# Patient Record
Sex: FEMALE | Race: BLACK OR AFRICAN AMERICAN | NOT HISPANIC OR LATINO | Employment: FULL TIME | URBAN - METROPOLITAN AREA
[De-identification: names, ages, dates, MRNs, and addresses within clinical notes are randomized per-mention and may not be internally consistent; named-entity substitution may affect disease eponyms.]

---

## 2018-02-16 ENCOUNTER — OFFICE VISIT CONVERTED (OUTPATIENT)
Dept: FAMILY MEDICINE CLINIC | Facility: CLINIC | Age: 28
End: 2018-02-16
Attending: NURSE PRACTITIONER

## 2018-04-17 ENCOUNTER — CONVERSION ENCOUNTER (OUTPATIENT)
Dept: GASTROENTEROLOGY | Facility: CLINIC | Age: 28
End: 2018-04-17

## 2018-04-17 ENCOUNTER — OFFICE VISIT CONVERTED (OUTPATIENT)
Dept: GASTROENTEROLOGY | Facility: CLINIC | Age: 28
End: 2018-04-17
Attending: NURSE PRACTITIONER

## 2018-08-01 ENCOUNTER — OFFICE VISIT CONVERTED (OUTPATIENT)
Dept: FAMILY MEDICINE CLINIC | Facility: CLINIC | Age: 28
End: 2018-08-01
Attending: NURSE PRACTITIONER

## 2018-09-05 ENCOUNTER — OFFICE VISIT CONVERTED (OUTPATIENT)
Dept: GASTROENTEROLOGY | Facility: CLINIC | Age: 28
End: 2018-09-05
Attending: NURSE PRACTITIONER

## 2019-05-20 ENCOUNTER — HOSPITAL ENCOUNTER (OUTPATIENT)
Dept: URGENT CARE | Facility: CLINIC | Age: 29
Discharge: HOME | End: 2019-05-20

## 2019-05-20 LAB — MONONUCLEOSIS TEST, QUAL: NEGATIVE

## 2019-05-22 LAB — BACTERIA SPEC AEROBE CULT: ABNORMAL

## 2020-03-05 ENCOUNTER — HOSPITAL ENCOUNTER (OUTPATIENT)
Dept: OTHER | Facility: HOSPITAL | Age: 30
Discharge: HOME | End: 2020-03-05
Attending: NURSE PRACTITIONER

## 2020-03-11 LAB — DEPRECATED HIV1 SER IB-IMP: NEGATIVE

## 2020-07-14 ENCOUNTER — HOSPITAL ENCOUNTER (OUTPATIENT)
Dept: URGENT CARE | Facility: CLINIC | Age: 30
Discharge: HOME | End: 2020-07-14
Attending: FAMILY MEDICINE

## 2021-02-20 ENCOUNTER — HOSPITAL ENCOUNTER (OUTPATIENT)
Dept: URGENT CARE | Facility: CLINIC | Age: 31
Discharge: HOME | End: 2021-02-20
Attending: EMERGENCY MEDICINE

## 2021-02-23 LAB
BACTERIA UR CULT: NORMAL
C TRACH RRNA CVX QL NAA+PROBE: NEGATIVE
N GONORRHOEA DNA SPEC QL NAA+PROBE: NEGATIVE
SARS-COV-2 RNA SPEC QL NAA+PROBE: NOT DETECTED

## 2021-05-16 VITALS
HEIGHT: 69 IN | DIASTOLIC BLOOD PRESSURE: 62 MMHG | SYSTOLIC BLOOD PRESSURE: 114 MMHG | WEIGHT: 174 LBS | BODY MASS INDEX: 25.77 KG/M2

## 2021-05-16 VITALS
SYSTOLIC BLOOD PRESSURE: 115 MMHG | BODY MASS INDEX: 27.11 KG/M2 | RESPIRATION RATE: 18 BRPM | OXYGEN SATURATION: 100 % | DIASTOLIC BLOOD PRESSURE: 59 MMHG | HEIGHT: 69 IN | WEIGHT: 183 LBS | HEART RATE: 73 BPM

## 2021-05-16 VITALS
RESPIRATION RATE: 16 BRPM | HEIGHT: 69 IN | SYSTOLIC BLOOD PRESSURE: 131 MMHG | DIASTOLIC BLOOD PRESSURE: 78 MMHG | OXYGEN SATURATION: 99 % | HEART RATE: 83 BPM

## 2021-05-16 VITALS
DIASTOLIC BLOOD PRESSURE: 64 MMHG | BODY MASS INDEX: 29.35 KG/M2 | WEIGHT: 198.12 LBS | SYSTOLIC BLOOD PRESSURE: 120 MMHG | HEIGHT: 69 IN

## 2023-04-25 ENCOUNTER — APPOINTMENT (OUTPATIENT)
Dept: CT IMAGING | Facility: HOSPITAL | Age: 33
End: 2023-04-25
Payer: COMMERCIAL

## 2023-04-25 ENCOUNTER — HOSPITAL ENCOUNTER (EMERGENCY)
Facility: HOSPITAL | Age: 33
Discharge: HOME | End: 2023-04-25
Attending: EMERGENCY MEDICINE | Admitting: EMERGENCY MEDICINE
Payer: COMMERCIAL

## 2023-04-25 VITALS
TEMPERATURE: 98.4 F | OXYGEN SATURATION: 100 % | HEIGHT: 69 IN | WEIGHT: 143 LBS | RESPIRATION RATE: 16 BRPM | DIASTOLIC BLOOD PRESSURE: 83 MMHG | HEART RATE: 91 BPM | SYSTOLIC BLOOD PRESSURE: 132 MMHG | BODY MASS INDEX: 21.18 KG/M2

## 2023-04-25 DIAGNOSIS — J20.9 ACUTE BRONCHITIS, UNSPECIFIED ORGANISM: Primary | ICD-10-CM

## 2023-04-25 LAB
ALBUMIN SERPL-MCNC: 3.9 G/DL (ref 3.5–5.2)
ALBUMIN/GLOB SERPL: 1 G/DL
ALP SERPL-CCNC: 76 U/L (ref 39–117)
ALT SERPL W P-5'-P-CCNC: 15 U/L (ref 1–33)
ANION GAP SERPL CALCULATED.3IONS-SCNC: 8 MMOL/L (ref 5–15)
AST SERPL-CCNC: 19 U/L (ref 1–32)
BASOPHILS # BLD AUTO: 0.02 10*3/MM3 (ref 0–0.2)
BASOPHILS NFR BLD AUTO: 0.4 % (ref 0–1.5)
BILIRUB SERPL-MCNC: <0.2 MG/DL (ref 0–1.2)
BUN SERPL-MCNC: 9 MG/DL (ref 6–20)
BUN/CREAT SERPL: 11.5 (ref 7–25)
CALCIUM SPEC-SCNC: 8.9 MG/DL (ref 8.6–10.5)
CHLORIDE SERPL-SCNC: 101 MMOL/L (ref 98–107)
CO2 SERPL-SCNC: 27 MMOL/L (ref 22–29)
CREAT SERPL-MCNC: 0.78 MG/DL (ref 0.57–1)
DEPRECATED RDW RBC AUTO: 45.8 FL (ref 37–54)
EGFRCR SERPLBLD CKD-EPI 2021: 104.3 ML/MIN/1.73
EOSINOPHIL # BLD AUTO: 0.41 10*3/MM3 (ref 0–0.4)
EOSINOPHIL NFR BLD AUTO: 7.5 % (ref 0.3–6.2)
ERYTHROCYTE [DISTWIDTH] IN BLOOD BY AUTOMATED COUNT: 13.5 % (ref 12.3–15.4)
GLOBULIN UR ELPH-MCNC: 3.9 GM/DL
GLUCOSE SERPL-MCNC: 107 MG/DL (ref 65–99)
HCT VFR BLD AUTO: 37.3 % (ref 34–46.6)
HGB BLD-MCNC: 12.2 G/DL (ref 12–15.9)
IMM GRANULOCYTES # BLD AUTO: 0.01 10*3/MM3 (ref 0–0.05)
IMM GRANULOCYTES NFR BLD AUTO: 0.2 % (ref 0–0.5)
INR PPP: 0.97 (ref 0.86–1.15)
LYMPHOCYTES # BLD AUTO: 1.78 10*3/MM3 (ref 0.7–3.1)
LYMPHOCYTES NFR BLD AUTO: 32.7 % (ref 19.6–45.3)
MCH RBC QN AUTO: 30 PG (ref 26.6–33)
MCHC RBC AUTO-ENTMCNC: 32.7 G/DL (ref 31.5–35.7)
MCV RBC AUTO: 91.6 FL (ref 79–97)
MONOCYTES # BLD AUTO: 0.66 10*3/MM3 (ref 0.1–0.9)
MONOCYTES NFR BLD AUTO: 12.1 % (ref 5–12)
NEUTROPHILS NFR BLD AUTO: 2.57 10*3/MM3 (ref 1.7–7)
NEUTROPHILS NFR BLD AUTO: 47.1 % (ref 42.7–76)
NRBC BLD AUTO-RTO: 0 /100 WBC (ref 0–0.2)
PLATELET # BLD AUTO: 306 10*3/MM3 (ref 140–450)
PMV BLD AUTO: 10.3 FL (ref 6–12)
POTASSIUM SERPL-SCNC: 3.7 MMOL/L (ref 3.5–5.2)
PROT SERPL-MCNC: 7.8 G/DL (ref 6–8.5)
PROTHROMBIN TIME: 13 SECONDS (ref 11.8–14.9)
RBC # BLD AUTO: 4.07 10*6/MM3 (ref 3.77–5.28)
S PYO AG THROAT QL: NEGATIVE
SODIUM SERPL-SCNC: 136 MMOL/L (ref 136–145)
WBC NRBC COR # BLD: 5.45 10*3/MM3 (ref 3.4–10.8)

## 2023-04-25 PROCEDURE — 80053 COMPREHEN METABOLIC PANEL: CPT | Performed by: EMERGENCY MEDICINE

## 2023-04-25 PROCEDURE — 85025 COMPLETE CBC W/AUTO DIFF WBC: CPT | Performed by: EMERGENCY MEDICINE

## 2023-04-25 PROCEDURE — 87880 STREP A ASSAY W/OPTIC: CPT | Performed by: EMERGENCY MEDICINE

## 2023-04-25 PROCEDURE — 71260 CT THORAX DX C+: CPT

## 2023-04-25 PROCEDURE — 85610 PROTHROMBIN TIME: CPT | Performed by: EMERGENCY MEDICINE

## 2023-04-25 PROCEDURE — 99283 EMERGENCY DEPT VISIT LOW MDM: CPT

## 2023-04-25 PROCEDURE — 87147 CULTURE TYPE IMMUNOLOGIC: CPT | Performed by: EMERGENCY MEDICINE

## 2023-04-25 PROCEDURE — 87081 CULTURE SCREEN ONLY: CPT | Performed by: EMERGENCY MEDICINE

## 2023-04-25 PROCEDURE — 25510000001 IOPAMIDOL PER 1 ML: Performed by: EMERGENCY MEDICINE

## 2023-04-25 RX ORDER — BENZONATATE 200 MG/1
200 CAPSULE ORAL 3 TIMES DAILY PRN
Qty: 20 CAPSULE | Refills: 0 | Status: SHIPPED | OUTPATIENT
Start: 2023-04-25

## 2023-04-25 RX ORDER — PREDNISONE 50 MG/1
50 TABLET ORAL DAILY
Qty: 5 TABLET | Refills: 0 | Status: SHIPPED | OUTPATIENT
Start: 2023-04-25

## 2023-04-25 RX ORDER — AZITHROMYCIN 250 MG/1
TABLET, FILM COATED ORAL
Qty: 6 TABLET | Refills: 0 | Status: SHIPPED | OUTPATIENT
Start: 2023-04-25 | End: 2023-04-30

## 2023-04-25 RX ADMIN — IOPAMIDOL 100 ML: 755 INJECTION, SOLUTION INTRAVENOUS at 09:45

## 2023-04-25 NOTE — DISCHARGE INSTRUCTIONS
Be sure to follow-up with the pulmonologist especially if your symptoms continue as a bronchoscopy may be the next step to evaluate the blood in your sputum.

## 2023-04-25 NOTE — ED PROVIDER NOTES
Time: 9:02 AM EDT  Date of encounter:  4/25/2023  Independent Historian/Clinical History and Information was obtained by: Patient, Family and Chart  Chief Complaint: cough  History is limited by: N/A  Room number: 21/21     History of Present Illness:  HPI  Patient is a 32 y.o. year old female who presents to the Emergency Department via private car for evaluation of cough with diaphoresis, fever, chills, hemoptysis, sore throat that started approximately 2:00am this morning (04/25/2023). Patient denies all other symptoms.    Patient Care Team  Primary Care Provider: Adrian Cartwright APRN    Past Medical History:  Allergies   Allergen Reactions   • Peanut Oil Anaphylaxis     Past Medical History:   Diagnosis Date   • Anemia    • Asthma    • Depression    • IBS (irritable bowel syndrome)    • Migraines    • Scoliosis      Past Surgical History:   Procedure Laterality Date   • COLONOSCOPY     • ENDOSCOPY     • ROOT CANAL       History reviewed. No pertinent family history.    Home Medications:  Prior to Admission medications    Medication Sig Start Date End Date Taking? Authorizing Provider   amitriptyline (ELAVIL) 25 MG tablet amitriptyline 25 mg oral tablet take 1 tablet (25 mg) by oral route once daily at bedtime   Active    Emergency, Nurse MARISOL Denney   cetirizine (zyrTEC) 10 MG tablet cetirizine 10 mg oral tablet take 1 tablet (10 mg) by oral route once daily   Active    Emergency, Nurse Epic, RN   cyclobenzaprine (FLEXERIL) 10 MG tablet cyclobenzaprine 10 mg oral tablet take 1 tablet (10 mg) by oral route 3 times per day   Active  Patient not taking: Reported on 7/23/2022    Emergency, Nurse Олег RN   diclofenac (VOLTAREN) 50 MG EC tablet diclofenac sodium 50 mg oral tablet,delayed release (DR/EC) take 1 tablet (50 mg) by oral route 3 times per day   Active    Emergency, Nurse Epic, RN   EPINEPHrine (EpiPen 2-Ector) 0.3 MG/0.3ML solution auto-injector injection 0.3 mL.    Emergency, Nurse Олег RN  "  ibuprofen (ADVIL,MOTRIN) 800 MG tablet ibuprofen 800 mg oral tablet take 1 tablet (800 mg) by oral route 3 times per day with food   Active    Emergency, Nurse Олег RN   topiramate (TOPAMAX) 50 MG tablet topiramate 50 mg oral tablet take 1 tablet (50 mg) by oral route 2 times per day   Active    Emergency, Nurse Epic, RN   triamcinolone (KENALOG) 0.1 % cream triamcinolone acetonide 0.1 % topical cream apply a thin layer to the affected area(s) by topical route 2 times per day   Active    Emergency, Nurse Epic, RN   venlafaxine (EFFEXOR) 50 MG tablet Take 50 mg by mouth 2 (Two) Times a Day.    Emergency, Nurse Олег RN        Social History:  Social History     Tobacco Use   • Smoking status: Former   • Smokeless tobacco: Never   Vaping Use   • Vaping Use: Every day   • Substances: Nicotine, Flavoring   Substance Use Topics   • Alcohol use: Yes     Comment: rare   • Drug use: Never       Review of Systems:  Review of Systems   Constitutional: Positive for chills, diaphoresis and fever.   HENT: Positive for sore throat.    Eyes: Negative.    Respiratory: Positive for cough (with blood).    Cardiovascular: Negative.    Gastrointestinal: Negative.  Vomiting: with blood.   Endocrine: Negative.    Genitourinary: Negative.    Musculoskeletal: Negative.    Skin: Negative.    Allergic/Immunologic: Negative.    Neurological: Negative.    Hematological: Negative.    Psychiatric/Behavioral: Negative.    All other systems reviewed and are negative.         Physical Exam:  /83   Pulse 95   Temp 98.4 °F (36.9 °C) (Oral)   Resp 16   Ht 175.3 cm (69\")   Wt 64.9 kg (143 lb)   SpO2 100%   BMI 21.12 kg/m²     Physical Exam  Vitals and nursing note reviewed.   Constitutional:       General: She is not in acute distress.     Appearance: Normal appearance. She is not toxic-appearing.   HENT:      Head: Normocephalic and atraumatic.      Jaw: There is normal jaw occlusion.   Eyes:      General: Lids are normal.      " Extraocular Movements: Extraocular movements intact.      Conjunctiva/sclera: Conjunctivae normal.      Pupils: Pupils are equal, round, and reactive to light.   Cardiovascular:      Rate and Rhythm: Normal rate and regular rhythm.      Pulses: Normal pulses.      Heart sounds: Normal heart sounds.   Pulmonary:      Effort: Pulmonary effort is normal. No respiratory distress.      Breath sounds: Normal breath sounds. No wheezing or rhonchi.   Abdominal:      General: Abdomen is flat.      Palpations: Abdomen is soft.      Tenderness: There is no abdominal tenderness. There is no guarding or rebound.   Musculoskeletal:         General: Normal range of motion.      Cervical back: Normal range of motion and neck supple.      Right lower leg: No edema.      Left lower leg: No edema.   Skin:     General: Skin is warm and dry.   Neurological:      Mental Status: She is alert and oriented to person, place, and time. Mental status is at baseline.   Psychiatric:         Mood and Affect: Mood normal.                Procedures:  Procedures    Medical Decision Making:    Comorbidities that affect care:    asthma    External Notes reviewed:    Previous Clinic Note: Patient was seen for cough and congestion in urgent care center.    The following orders were placed and all results were independently analyzed by me:  Orders Placed This Encounter   Procedures   • Rapid Strep A Screen - Swab, Throat   • Beta Strep Culture, Throat - Swab, Throat   • CT Chest With Contrast Diagnostic   • Comprehensive Metabolic Panel   • Protime-INR   • CBC Auto Differential   • CBC & Differential       Medications Given in the Emergency Department:  Medications   iopamidol (ISOVUE-370) 76 % injection 100 mL (100 mL Intravenous Given 4/25/23 0945)       ED Course:       Labs:  Lab Results (last 24 hours)     Procedure Component Value Units Date/Time    CBC & Differential [[id number]]  (Abnormal) Collected: 04/25/23 0917    Specimen: Blood Updated:  04/25/23 0932    Narrative:      The following orders were created for panel order CBC & Differential.  Procedure                               Abnormality         Status                     ---------                               -----------         ------                     CBC Auto Differential[[id number]]        Abnormal            Final result                 Please view results for these tests on the individual orders.    Comprehensive Metabolic Panel [[id number]]  (Abnormal) Collected: 04/25/23 0917    Specimen: Blood Updated: 04/25/23 0954     Glucose 107 mg/dL      BUN 9 mg/dL      Creatinine 0.78 mg/dL      Sodium 136 mmol/L      Potassium 3.7 mmol/L      Chloride 101 mmol/L      CO2 27.0 mmol/L      Calcium 8.9 mg/dL      Total Protein 7.8 g/dL      Albumin 3.9 g/dL      ALT (SGPT) 15 U/L      AST (SGOT) 19 U/L      Alkaline Phosphatase 76 U/L      Total Bilirubin <0.2 mg/dL      Globulin 3.9 gm/dL      A/G Ratio 1.0 g/dL      BUN/Creatinine Ratio 11.5     Anion Gap 8.0 mmol/L      eGFR 104.3 mL/min/1.73     Narrative:      GFR Normal >60  Chronic Kidney Disease <60  Kidney Failure <15      Protime-INR [[id number]]  (Normal) Collected: 04/25/23 0917    Specimen: Blood Updated: 04/25/23 0941     Protime 13.0 Seconds      INR 0.97    Narrative:      Suggested Therapeutic Ranges For Oral Anticoagulant Therapy:  Level of Therapy                      INR Target Range  Standard Dose                            2.0-3.0  High Dose                                2.5-3.5  Patients not receiving anticoagulant  Therapy Normal Range                     0.86-1.15    Rapid Strep A Screen - Swab, Throat [[id number]]  (Normal) Collected: 04/25/23 0917    Specimen: Swab from Throat Updated: 04/25/23 0954     Strep A Ag Negative    CBC Auto Differential [[id number]]  (Abnormal) Collected: 04/25/23 0917    Specimen: Blood Updated: 04/25/23 0932     WBC 5.45 10*3/mm3      RBC 4.07 10*6/mm3      Hemoglobin 12.2 g/dL      Hematocrit  37.3 %      MCV 91.6 fL      MCH 30.0 pg      MCHC 32.7 g/dL      RDW 13.5 %      RDW-SD 45.8 fl      MPV 10.3 fL      Platelets 306 10*3/mm3      Neutrophil % 47.1 %      Lymphocyte % 32.7 %      Monocyte % 12.1 %      Eosinophil % 7.5 %      Basophil % 0.4 %      Immature Grans % 0.2 %      Neutrophils, Absolute 2.57 10*3/mm3      Lymphocytes, Absolute 1.78 10*3/mm3      Monocytes, Absolute 0.66 10*3/mm3      Eosinophils, Absolute 0.41 10*3/mm3      Basophils, Absolute 0.02 10*3/mm3      Immature Grans, Absolute 0.01 10*3/mm3      nRBC 0.0 /100 WBC     Beta Strep Culture, Throat - Swab, Throat [[id number]] Collected: 04/25/23 0917    Specimen: Swab from Throat Updated: 04/25/23 0954           Imaging:  CT Chest With Contrast Diagnostic    Result Date: 4/25/2023  PROCEDURE: CT CHEST W CONTRAST DIAGNOSTIC  COMPARISON:  None INDICATIONS: shortness of breath, coughing up blood, cough since friday  TECHNIQUE: After obtaining the patient's consent, CT images were obtained with non-ionic intravenous contrast material.   PROTOCOL:   Pulmonary embolism imaging protocol performed    RADIATION:   DLP: 439.4mGy*cm   Automated exposure control was utilized to minimize radiation dose. CONTRAST: 100cc Isovue 370 I.V.  FINDINGS:  There is probable minor dependent atelectasis in the lung bases.  No focal pulmonary consolidations are evident.  No pleural fluid is seen.  Central airways are grossly patent.  The pulmonary arteries appear normal caliber.  No pulmonary arterial filling defects are evident to suggest PE.  The thoracic aorta appears normal caliber.  No mediastinal adenopathy is identified.  Amorphous density in the anterior mediastinal fat may be due to residual thymic tissue.  Limited imaging through the upper abdomen demonstrates a grossly normal adrenal morphology.  No acute abnormality is identified.  No axillary or supraclavicular adenopathy is identified.  Visualized thyroid is unremarkable.  Visualized osseous  structures are unremarkable.         1. No acute thoracic abnormality identified.  No evidence of pulmonary embolus.  2. Probable minor dependent atelectasis in the lung bases.  3. Incidental findings as given above.      PAULINE FRAZIER MD       Electronically Signed and Approved By: PAULINE FRAZIER MD on 4/25/2023 at 10:19               Differential Diagnosis and Discussion:    Cough: Differential diagnosis includes but is not limited to pneumonia, acute bronchitis, upper respiratory infection, ACE inhibitor use, allergic reaction, epiglottitis, seasonal allergies, chemical irritants, exercise-induced asthma, viral syndrome.    All labs were reviewed and interpreted by me.  CT scan radiology impression was interpreted by me.     The patient´s CBC that was reviewed and interpreted by me shows no abnormalities of critical concern. Of note, there is no anemia requiring a blood transfusion and the platelet count is acceptable.  The patient´s CMP that was reviewed and interpretted by me shows no abnormalities of critical concern. Of note, the patient´s sodium and potassium are acceptable. The patient´s liver enzymes are unremarkable. The patient´s renal function (creatinine) is preserved. The patient has a normal anion gap.  CT scan of the chest is negative for PE or intrathoracic mass.    MDM     The differential diagnosis for hemoptysis includes a wide range of conditions such as bronchitis, pneumonia, tuberculosis, lung cancer, pulmonary embolism, and bronchiectasis.    A normal CT scan of the chest can help rule out certain entities in the differential diagnosis of hemoptysis, including:    Pulmonary embolism: A pulmonary embolism is a blood clot that travels to the lungs and can cause hemoptysis. A normal CT scan can effectively rule out pulmonary embolism as a cause of hemoptysis.    Lung cancer: A CT scan of the chest can detect lung nodules and masses, which can be indicative of lung cancer. A normal CT scan can help  rule out lung cancer as a cause of hemoptysis.    Bronchiectasis: Bronchiectasis is a condition in which the airways in the lungs become damaged and widened, leading to recurrent infections and hemoptysis. A CT scan can detect the characteristic changes in the airways, and a normal CT scan can help rule out bronchiectasis as a cause of hemoptysis.    The patient presented with a productive cough. The patient is well-appearing and in no respiratory distress. The patient has a normal mental status and is neurologically intact. The patient appears well and is able to tolerate food for fluid by mouth and there's no significant dehydration. There is no respiratory distress and no signs of systemic toxicity. The history, exam, diagnostic testing and current condition do not demonstrate an infectious process such as meningitis, severe pneumonia, retropharyngeal abscess, epiglottitis, sepsis or other serious bacterial infection requiring further testing, treatment, consultation, or admission at this time. The patient has no additional oxygen requirement nor are there any signs of respiratory distress. The patient has a chest x-ray interpreted by me that is negative for pneumonia. Asthma, URI, GERD are also considered. The vital signs have been stable and the patient has had no hypoxia. The patient's condition is stable and appropriate for discharge. The patient will pursue further outpatient evaluation with the primary care physician, or designated physician. The patient will expressed a clear and thorough understanding and agreed to follow-up as instructed.        Patient Care Considerations:    CONSULT: I considered consulting Pulmonology, however The patient has no respiratory distress, nor is she hypoxic.    Consultants/Shared Management Plan:    None    Social Determinants of Health:    Patient is independent, reliable, and has access to care.     Disposition and Care Coordination:    Discharged: I considered  escalation of care by admitting this patient for observation, however the patient has improved and is suitable and  stable for discharge.    I have explained the patient´s condition, diagnoses and treatment plan based on the information available to me at this time. I have answered questions and addressed any concerns. The patient has a good  understanding of the patient´s diagnosis, condition, and treatment plan as can be expected at this point. The vital signs have been stable. The patient´s condition is stable and appropriate for discharge from the emergency department.      The patient will pursue further outpatient evaluation with the primary care physician or other designated or consulting physician as outlined in the discharge instructions. They are agreeable to this plan of care and follow-up instructions have been explained in detail. The patient has received these instructions in written format and have expressed an understanding of the discharge instructions. The patient is aware that any significant change in condition or worsening of symptoms should prompt an immediate return to this or the closest emergency department or call to 911.  I have explained discharge medications and the need for follow up with the patient/caretakers. This was also printed in the discharge instructions. Patient was discharged with the following medications and follow up:      Medication List      New Prescriptions    azithromycin 250 MG tablet  Commonly known as: ZITHROMAX  Take 2 tablets by mouth Daily for 1 day, THEN 1 tablet Daily for 4 days.  Start taking on: April 25, 2023     benzonatate 200 MG capsule  Commonly known as: TESSALON  Take 1 capsule by mouth 3 (Three) Times a Day As Needed for Cough.     predniSONE 50 MG tablet  Commonly known as: DELTASONE  Take 1 tablet by mouth Daily.           Where to Get Your Medications      [id number]  [place] - [address] AT [facility] - [phone]  - [phone] FX  [address] [place] [address]    Phone: [phone]   · azithromycin 250 MG tablet  · benzonatate 200 MG capsule  · predniSONE 50 MG tablet      Adrian Cartwright, APRN  [address] [place] [address] [phone]    In 2 days         Final diagnoses:   Acute bronchitis, unspecified organism        ED Disposition     ED Disposition   Discharge    Condition   Stable    Comment   --             This medical record created using voice recognition software.    Documentation assistance provided by Cathryn Lloyd acting a scribe for Sneha Parker MD. Information recorded by the scribe was verified and validated at my direction.     Cathryn Lloyd  04/25/23 0909       Sneha Parker MD  04/25/23 8959

## 2023-04-27 LAB — BACTERIA SPEC AEROBE CULT: NORMAL

## 2023-12-01 PROCEDURE — 87635 SARS-COV-2 COVID-19 AMP PRB: CPT | Performed by: EMERGENCY MEDICINE

## 2024-01-29 PROCEDURE — 87081 CULTURE SCREEN ONLY: CPT | Performed by: FAMILY MEDICINE

## 2024-03-20 ENCOUNTER — TELEMEDICINE (OUTPATIENT)
Dept: FAMILY MEDICINE CLINIC | Facility: TELEHEALTH | Age: 34
End: 2024-03-20
Payer: COMMERCIAL

## 2024-03-20 DIAGNOSIS — Z20.822 EXPOSURE TO COVID-19 VIRUS: Primary | ICD-10-CM

## 2024-03-20 NOTE — PROGRESS NOTES
You have chosen to receive care through a telehealth visit.  Do you consent to use a video/audio connection for your medical care today? Yes     CHIEF COMPLAINT  No chief complaint on file.        HPI  Maggi Nelson is a 33 y.o. female  presents with complaint of traveling to [facility], needs COVID test to prove no infection.  She is not currently having any symptoms.  Her mother tested positive on 3/9/24 and she and daughter were around her,but have not developed symptoms.      Review of Systems  See HPI    Past Medical History:   Diagnosis Date    Anemia     Asthma     Depression     IBS (irritable bowel syndrome)     Migraines     Scoliosis        No family history on file.    Social History     Socioeconomic History    Marital status: Single   Tobacco Use    Smoking status: Former    Smokeless tobacco: Never   Vaping Use    Vaping status: Every Day    Substances: Nicotine, Flavoring   Substance and Sexual Activity    Alcohol use: Yes     Comment: rare    Drug use: Never    Sexual activity: Defer       Maggi Nelson  reports that she has quit smoking. She has never used smokeless tobacco.               There were no vitals taken for this visit.    PHYSICAL EXAM  Physical Exam   Constitutional: She is oriented to person, place, and time. She appears well-developed and well-nourished. She does not have a sickly appearance. She does not appear ill.   HENT:   Head: Normocephalic and atraumatic.   Pulmonary/Chest: Effort normal.  No respiratory distress.  Neurological: She is alert and oriented to person, place, and time.       Results for orders placed or performed during the hospital encounter of 03/20/24   DENNISE FLU + SARS PCR    Specimen: Nasal Cavity; Swab   Result Value Ref Range    COVID19 Not Detected Not Detected - Ref. Range    POC Influenza A, Molecular Negative Negative / Not Detected    POC Influenza B, Molecular Negative Negative / Not Detected    Internal Control Passed  Passed    Lot Number [id number]     Expiration Date 70,243,742        Diagnoses and all orders for this visit:    1. Exposure to COVID-19 virus (Primary)  -     DENNISE FLU + SARS PCR; Future    Negative, pt notified via phone.       FOLLOW-UP  As discussed during visit with PCP/[facility] if no improvement or Urgent Care/Emergency Department if worsening of symptoms    Patient verbalizes understanding of medication dosage, comfort measures, instructions for treatment and follow-up.    Carol Hicks, APRN  03/20/2024  14:59 EDT    The use of a video visit has been reviewed with the patient and verbal informed consent has been obtained. Myself and Maggi Nelson participated in this visit. The patient is located in [address] [place], [place] [address] [place] [address].    I am located in [place], [place]. Mychart and Twilio were utilized. I spent 8 minutes in the patient's chart for this visit.      Note Disclaimer: At [facility], we believe that sharing information builds trust and better   relationships. You are receiving this note because you recently visited [facility]. It is possible you   will see health information before a provider has talked with you about it. This kind of information can   be easy to misunderstand. To help you fully understand what it means for your health, we urge you to   discuss this note with your provider.

## 2024-11-22 ENCOUNTER — OFFICE VISIT (OUTPATIENT)
Dept: FAMILY MEDICINE CLINIC | Facility: CLINIC | Age: 34
End: 2024-11-22
Payer: COMMERCIAL

## 2024-11-22 ENCOUNTER — TELEPHONE (OUTPATIENT)
Dept: FAMILY MEDICINE CLINIC | Facility: CLINIC | Age: 34
End: 2024-11-22

## 2024-11-22 ENCOUNTER — PRIOR AUTHORIZATION (OUTPATIENT)
Dept: FAMILY MEDICINE CLINIC | Facility: CLINIC | Age: 34
End: 2024-11-22

## 2024-11-22 VITALS
OXYGEN SATURATION: 100 % | TEMPERATURE: 98.1 F | WEIGHT: 232.5 LBS | SYSTOLIC BLOOD PRESSURE: 122 MMHG | BODY MASS INDEX: 34.44 KG/M2 | HEART RATE: 81 BPM | DIASTOLIC BLOOD PRESSURE: 76 MMHG | HEIGHT: 69 IN

## 2024-11-22 DIAGNOSIS — L20.82 FLEXURAL ECZEMA: ICD-10-CM

## 2024-11-22 DIAGNOSIS — E55.9 VITAMIN D DEFICIENCY: ICD-10-CM

## 2024-11-22 DIAGNOSIS — Z00.00 ENCOUNTER FOR MEDICAL EXAMINATION TO ESTABLISH CARE: Primary | ICD-10-CM

## 2024-11-22 DIAGNOSIS — G43.901 MIGRAINE WITH STATUS MIGRAINOSUS, NOT INTRACTABLE, UNSPECIFIED MIGRAINE TYPE: ICD-10-CM

## 2024-11-22 DIAGNOSIS — R53.83 FATIGUE, UNSPECIFIED TYPE: ICD-10-CM

## 2024-11-22 DIAGNOSIS — Z97.5 IUD (INTRAUTERINE DEVICE) IN PLACE: ICD-10-CM

## 2024-11-22 DIAGNOSIS — Z91.010 PEANUT ALLERGY: ICD-10-CM

## 2024-11-22 DIAGNOSIS — Z00.00 ANNUAL PHYSICAL EXAM: ICD-10-CM

## 2024-11-22 DIAGNOSIS — F32.A ANXIETY AND DEPRESSION: ICD-10-CM

## 2024-11-22 DIAGNOSIS — K58.1 IRRITABLE BOWEL SYNDROME WITH CONSTIPATION: ICD-10-CM

## 2024-11-22 DIAGNOSIS — G47.26 SHIFT WORK SLEEP DISORDER: ICD-10-CM

## 2024-11-22 DIAGNOSIS — F41.9 ANXIETY AND DEPRESSION: ICD-10-CM

## 2024-11-22 LAB
25(OH)D3 SERPL-MCNC: 19.7 NG/ML (ref 30–100)
ALBUMIN SERPL-MCNC: 4.1 G/DL (ref 3.5–5.2)
ALBUMIN/GLOB SERPL: 1.2 G/DL
ALP SERPL-CCNC: 72 U/L (ref 39–117)
ALT SERPL W P-5'-P-CCNC: 12 U/L (ref 1–33)
ANION GAP SERPL CALCULATED.3IONS-SCNC: 10 MMOL/L (ref 5–15)
AST SERPL-CCNC: 19 U/L (ref 1–32)
BASOPHILS # BLD MANUAL: 0.06 10*3/MM3 (ref 0–0.2)
BASOPHILS NFR BLD MANUAL: 1 % (ref 0–1.5)
BILIRUB SERPL-MCNC: 0.2 MG/DL (ref 0–1.2)
BUN SERPL-MCNC: 17 MG/DL (ref 6–20)
BUN/CREAT SERPL: 18.9 (ref 7–25)
CALCIUM SPEC-SCNC: 9.2 MG/DL (ref 8.6–10.5)
CHLORIDE SERPL-SCNC: 100 MMOL/L (ref 98–107)
CHOLEST SERPL-MCNC: 152 MG/DL (ref 0–200)
CO2 SERPL-SCNC: 25 MMOL/L (ref 22–29)
CREAT SERPL-MCNC: 0.9 MG/DL (ref 0.57–1)
DEPRECATED RDW RBC AUTO: 40.6 FL (ref 37–54)
EGFRCR SERPLBLD CKD-EPI 2021: 87.3 ML/MIN/1.73
EOSINOPHIL # BLD MANUAL: 0.36 10*3/MM3 (ref 0–0.4)
EOSINOPHIL NFR BLD MANUAL: 6.2 % (ref 0.3–6.2)
ERYTHROCYTE [DISTWIDTH] IN BLOOD BY AUTOMATED COUNT: 12.4 % (ref 12.3–15.4)
FOLATE SERPL-MCNC: 9.82 NG/ML (ref 4.78–24.2)
GLOBULIN UR ELPH-MCNC: 3.4 GM/DL
GLUCOSE SERPL-MCNC: 93 MG/DL (ref 65–99)
HCT VFR BLD AUTO: 32.7 % (ref 34–46.6)
HDLC SERPL-MCNC: 49 MG/DL (ref 40–60)
HGB BLD-MCNC: 10.8 G/DL (ref 12–15.9)
LDLC SERPL CALC-MCNC: 94 MG/DL (ref 0–100)
LDLC/HDLC SERPL: 1.95 {RATIO}
LYMPHOCYTES # BLD MANUAL: 3.09 10*3/MM3 (ref 0.7–3.1)
LYMPHOCYTES NFR BLD MANUAL: 6.2 % (ref 5–12)
MCH RBC QN AUTO: 29.8 PG (ref 26.6–33)
MCHC RBC AUTO-ENTMCNC: 33 G/DL (ref 31.5–35.7)
MCV RBC AUTO: 90.1 FL (ref 79–97)
MONOCYTES # BLD: 0.36 10*3/MM3 (ref 0.1–0.9)
NEUTROPHILS # BLD AUTO: 1.9 10*3/MM3 (ref 1.7–7)
NEUTROPHILS NFR BLD MANUAL: 33 % (ref 42.7–76)
PLAT MORPH BLD: NORMAL
PLATELET # BLD AUTO: 290 10*3/MM3 (ref 140–450)
PMV BLD AUTO: 11.2 FL (ref 6–12)
POTASSIUM SERPL-SCNC: 3.7 MMOL/L (ref 3.5–5.2)
PROT SERPL-MCNC: 7.5 G/DL (ref 6–8.5)
RBC # BLD AUTO: 3.63 10*6/MM3 (ref 3.77–5.28)
RBC MORPH BLD: NORMAL
SODIUM SERPL-SCNC: 135 MMOL/L (ref 136–145)
T4 FREE SERPL-MCNC: 1.05 NG/DL (ref 0.92–1.68)
TRIGL SERPL-MCNC: 37 MG/DL (ref 0–150)
TSH SERPL DL<=0.05 MIU/L-ACNC: 10.7 UIU/ML (ref 0.27–4.2)
VARIANT LYMPHS NFR BLD MANUAL: 53.6 % (ref 19.6–45.3)
VIT B12 BLD-MCNC: 769 PG/ML (ref 211–946)
VLDLC SERPL-MCNC: 9 MG/DL (ref 5–40)
WBC MORPH BLD: NORMAL
WBC NRBC COR # BLD AUTO: 5.77 10*3/MM3 (ref 3.4–10.8)

## 2024-11-22 PROCEDURE — 84439 ASSAY OF FREE THYROXINE: CPT

## 2024-11-22 PROCEDURE — 82746 ASSAY OF FOLIC ACID SERUM: CPT

## 2024-11-22 PROCEDURE — 80061 LIPID PANEL: CPT

## 2024-11-22 PROCEDURE — 82306 VITAMIN D 25 HYDROXY: CPT

## 2024-11-22 PROCEDURE — 85027 COMPLETE CBC AUTOMATED: CPT

## 2024-11-22 PROCEDURE — 82607 VITAMIN B-12: CPT

## 2024-11-22 PROCEDURE — 84443 ASSAY THYROID STIM HORMONE: CPT

## 2024-11-22 PROCEDURE — 80053 COMPREHEN METABOLIC PANEL: CPT

## 2024-11-22 PROCEDURE — 85007 BL SMEAR W/DIFF WBC COUNT: CPT

## 2024-11-22 RX ORDER — CLOBETASOL PROPIONATE 0.5 MG/G
1 OINTMENT TOPICAL 2 TIMES DAILY
Qty: 60 G | Refills: 1 | Status: SHIPPED | OUTPATIENT
Start: 2024-11-22

## 2024-11-22 RX ORDER — VENLAFAXINE HYDROCHLORIDE 37.5 MG/1
CAPSULE, EXTENDED RELEASE ORAL
Qty: 60 CAPSULE | Refills: 2 | Status: SHIPPED | OUTPATIENT
Start: 2024-11-22

## 2024-11-22 RX ORDER — EPINEPHRINE 0.3 MG/.3ML
0.3 INJECTION SUBCUTANEOUS ONCE
Qty: 2 EACH | Refills: 0 | Status: SHIPPED | OUTPATIENT
Start: 2024-11-22 | End: 2024-11-22

## 2024-11-22 NOTE — PROGRESS NOTES
Chief Complaint  Chief Complaint   Patient presents with    [facility]    Annual Exam    Fatigue    Anxiety       Subjective      Maggi Nelson presents to [facility]  History of Present Illness  The patient is a 33-year-old female who presents today as a new patient to [facility] and have a physical exam.    She reports feeling very tired and fatigued with an increase in anxiety recently. She has been dealing with anxiety and depression for a long time but is not currently on any medication due to side effects. She has not had therapy for a year due to insurance issues. 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    She is requesting a referral to gynecology as her current Mirena IUD has . She is not currently sexually active and has not had a period since the IUD was placed. Prior to the IUD, she had heavy and painful periods.    She has several severe allergies, including allergies to nuts and peanut oil, and is in need of a new EpiPen.    Her medical history includes anemia, anxiety, asthma, depression, irritable bowel syndrome (IBS), migraines, and scoliosis. She also suffers from eczema, which is currently affecting her armpits, neck, and legs. The eczema in her armpits has worsened, resembling hidradenitis suppurativa (HS). She had previously used chlorhexidine soap and cortisone cream for relief, but the latter causes a burning sensation. She has not used deodorant for a month due to the burning sensation it causes.    She has a family history of high blood pressure, high cholesterol, diabetes, and thyroid problems.    She was previously on Topamax for migraines and Ambien for sleep. She has a history of constipation and is currently experiencing it, having not had a bowel movement in 4 days. She had a colonoscopy 7 years ago and was on Linzess, which was helpful. She also  experiences abdominal pain and cramping.    ALLERGIES  She is allergic to NUTS and PEANUT OIL.      Objective     Medical History:  Past Medical History:   Diagnosis Date    Anemia     Anxiety     Asthma     Depression     IBS (irritable bowel syndrome)     Migraines     Scoliosis      Past Surgical History:   Procedure Laterality Date    COLONOSCOPY      ENDOSCOPY      MULTIPLE TOOTH EXTRACTIONS      ROOT CANAL        Social History     Tobacco Use    Smoking status: Former     Current packs/day: 0.00     Average packs/day: 1 pack/day for 15.0 years (15.0 ttl pk-yrs)     Types: Cigarettes     Start date: 2008     Quit date: 2023     Years since quittin.8    Smokeless tobacco: Never   Vaping Use    Vaping status: Every Day    Substances: Nicotine, Flavoring    Devices: Disposable   Substance Use Topics    Alcohol use: Yes     Comment: rare    Drug use: Never     History reviewed. No pertinent family history.    Medications:  Prior to Admission medications    Medication Sig Start Date End Date Taking? Authorizing Provider   benzonatate (TESSALON) 200 MG capsule Take 1 capsule by mouth 3 (Three) Times a Day As Needed for Cough. 24   Ron Mckay MD   brompheniramine-pseudoephedrine-DM 30-2-10 MG/5ML syrup Take 10 mL by mouth 3 (Three) Times a Day As Needed for Congestion or Cough. 24   Ron Mckay MD   ibuprofen (ADVIL,MOTRIN) 800 MG tablet Take 1 tablet by mouth Every 8 (Eight) Hours As Needed for Moderate Pain, Fever or Headache. Take with food 24   Oscar Nichols, DO   ondansetron ODT (ZOFRAN-ODT) 4 MG disintegrating tablet Place 2 tablets on the tongue Every 8 (Eight) Hours As Needed for Nausea or Vomiting. 24   Ron Mckay MD   phenol (CHLORASEPTIC) 1.4 % liquid liquid Apply 1 spray to the mouth or throat Every 2 (Two) Hours As Needed (Sore throat). 24   Oscar Nichols DO        Allergies:   Nuts, Peanut oil, and Adhesive tape    Health Maintenance Due   Topic  "Date Due    BMI FOLLOWUP  Never done    Pneumococcal Vaccine 0-64 (1 of 2 - PCV) Never done    TDAP/TD VACCINES (1 - Tdap) Never done    HEPATITIS C SCREENING  Never done    ANNUAL PHYSICAL  Never done    PAP SMEAR  Never done    COVID-19 Vaccine (1 - 2024-25 season) Never done         Vital Signs:   /76 (BP Location: Left arm, Patient Position: Sitting, Cuff Size: Adult)   Pulse 81   Temp 98.1 °F (36.7 °C) (Oral)   Ht 175.3 cm (69\")   Wt 105 kg (232 lb 8 oz)   SpO2 100%   BMI 34.33 kg/m²     Wt Readings from Last 3 Encounters:   11/22/24 105 kg (232 lb 8 oz)   01/31/24 105 kg (232 lb)   01/29/24 104 kg (228 lb 9.6 oz)     BP Readings from Last 3 Encounters:   11/22/24 122/76   01/31/24 134/79   01/29/24 159/69       BMI is >= 30 and <35. (Class 1 Obesity). The following options were offered after discussion;: exercise counseling/recommendations and nutrition counseling/recommendations       Physical Exam  Vitals reviewed.   Constitutional:       Appearance: Normal appearance. She is well-developed. She is obese.   HENT:      Head: Normocephalic and atraumatic.   Eyes:      Conjunctiva/sclera: Conjunctivae normal.      Pupils: Pupils are equal, round, and reactive to light.   Cardiovascular:      Rate and Rhythm: Normal rate and regular rhythm.      Heart sounds: No murmur heard.     No friction rub. No gallop.   Pulmonary:      Effort: Pulmonary effort is normal.      Breath sounds: Normal breath sounds. No wheezing or rhonchi.   Abdominal:      General: Bowel sounds are normal. There is no distension.      Palpations: Abdomen is soft.      Tenderness: There is no abdominal tenderness.   Skin:     General: Skin is warm and dry.   Neurological:      Mental Status: She is alert and oriented to person, place, and time.      Cranial Nerves: No cranial nerve deficit.   Psychiatric:         Mood and Affect: Mood and affect normal.         Behavior: Behavior normal.         Thought Content: Thought content " normal.         Judgment: Judgment normal.       Physical Exam        Result Review :    The following data was reviewed by DARREN Stein on 11/22/24 at 08:47 EST:        No Images in the past 120 days found..    Results                 Assessment and Plan    Diagnoses and all orders for this visit:    1. Encounter for medical examination to establish care (Primary)    2. Annual physical exam  -     CBC With Manual Differential  -     Comprehensive Metabolic Panel  -     Lipid Panel  -     TSH Rfx On Abnormal To Free T4    3. Fatigue, unspecified type  -     Vitamin D 25 hydroxy  -     Vitamin B12  -     Folate    4. Shift work sleep disorder    5. Peanut allergy  -     EPINEPHrine (EpiPen 2-Ector) 0.3 MG/0.3ML solution auto-injector injection; Inject 0.3 mL into the appropriate muscle as directed by prescriber 1 (One) Time for 1 dose.  Dispense: 2 each; Refill: 0    6. Flexural eczema  -     clobetasol (TEMOVATE) 0.05 % ointment; Apply 1 Application topically to the appropriate area as directed 2 (Two) Times a Day.  Dispense: 60 g; Refill: 01    7. Anxiety and depression  -     venlafaxine XR (Effexor XR) 37.5 MG 24 hr capsule; Take 1 tablet by mouth daily for 7 days, then take 2 tablets by mouth daily.  Dispense: 60 capsule; Refill: 2    8. Migraine with status migrainosus, not intractable, unspecified migraine type    9. Irritable bowel syndrome with constipation    10. IUD (intrauterine device) in place  -     Ambulatory Referral to Obstetrics / Gynecology    11. Vitamin D deficiency    Other orders  -     linaclotide (Linzess) 72 MCG capsule capsule; Take 1 capsule by mouth Every Morning Before Breakfast.  Dispense: 30 capsule; Refill: 2       Assessment & Plan  1. Eczema.  Clobetasol ointment was prescribed to be applied once or twice daily during flare-ups. She was advised that prolonged use of the steroid could cause darkening patches on the skin. She was also advised to use a sensitive deodorant  without alcohol or other chemicals once the condition improves.    2. Mirena IUD expiration.  A referral to gynecology was made for the removal of her  Mirena IUD and to discuss future contraceptive options.    3. Irritable Bowel Syndrome (IBS) with constipation.  Linzess was prescribed to manage her constipation.    4. Anxiety and Depression.  Effexor was prescribed to help with anxiety, depression, and migraine prevention. She was instructed to take one tablet daily for a week, then increase to two tablets daily, preferably in the morning to avoid affecting her sleep.    5. Severe allergies to nuts and peanut oil.  A prescription for an EpiPen was provided due to her severe allergies.    6. Fatigue.  Blood work was ordered to assess her blood sugar, kidney and liver function, thyroid level, vitamin D, and B12 levels to determine any underlying causes of her fatigue.    7. Hidradenitis Suppurativa (HS).  She was advised to return for evaluation and potential antibiotic treatment if her HS flares up.            Smoking Cessation:    Maggi Nelson  reports that she quit smoking about 22 months ago. Her smoking use included cigarettes. She started smoking about 16 years ago. She has a 15 pack-year smoking history. She has never used smokeless tobacco.             Follow Up   Return in about 3 months (around 2025) for Next scheduled follow up.  Patient was given instructions and counseling regarding her condition or for health maintenance advice. Please see specific information pulled into the AVS if appropriate.     Please note that portions of this note were completed with a voice recognition program.    Patient or patient representative verbalized consent for the use of Ambient Listening during the visit with  DARREN Stein for chart documentation. 2024  08:46 EST

## 2024-11-22 NOTE — TELEPHONE ENCOUNTER
Linzess 72MCG capsules PA APPROVAL     PA Case ID #: [id number]  Rx #: [id number]  Need Help? Call us at [phone]  Outcome  Approved today by Christine [facility] 2017  Your PA request has been approved. Additional information will be provided in the approval communication. (Message 1145)  Authorization Expiration Date: 11/22/2027

## 2024-11-26 DIAGNOSIS — Z91.010 PEANUT ALLERGY: Primary | ICD-10-CM

## 2024-11-27 DIAGNOSIS — E55.9 VITAMIN D DEFICIENCY: Primary | ICD-10-CM

## 2024-11-27 DIAGNOSIS — E61.1 IRON DEFICIENCY: ICD-10-CM

## 2024-11-27 RX ORDER — FERROUS GLUCONATE 324(38)MG
324 TABLET ORAL
Qty: 30 TABLET | Refills: 5 | Status: SHIPPED | OUTPATIENT
Start: 2024-11-27

## 2024-12-07 PROCEDURE — 87081 CULTURE SCREEN ONLY: CPT

## 2025-01-17 ENCOUNTER — OFFICE VISIT (OUTPATIENT)
Dept: FAMILY MEDICINE CLINIC | Facility: CLINIC | Age: 35
End: 2025-01-17
Payer: COMMERCIAL

## 2025-01-17 VITALS
OXYGEN SATURATION: 99 % | HEIGHT: 69 IN | HEART RATE: 91 BPM | DIASTOLIC BLOOD PRESSURE: 76 MMHG | TEMPERATURE: 98.1 F | BODY MASS INDEX: 34.14 KG/M2 | SYSTOLIC BLOOD PRESSURE: 134 MMHG | WEIGHT: 230.5 LBS

## 2025-01-17 DIAGNOSIS — F41.9 ANXIETY AND DEPRESSION: ICD-10-CM

## 2025-01-17 DIAGNOSIS — F32.A ANXIETY AND DEPRESSION: ICD-10-CM

## 2025-01-17 DIAGNOSIS — E55.9 VITAMIN D DEFICIENCY: ICD-10-CM

## 2025-01-17 DIAGNOSIS — G43.901 MIGRAINE WITH STATUS MIGRAINOSUS, NOT INTRACTABLE, UNSPECIFIED MIGRAINE TYPE: ICD-10-CM

## 2025-01-17 DIAGNOSIS — R11.2 NAUSEA AND VOMITING, UNSPECIFIED VOMITING TYPE: ICD-10-CM

## 2025-01-17 DIAGNOSIS — L02.416 ABSCESS OF LEFT LOWER LEG: Primary | ICD-10-CM

## 2025-01-17 RX ORDER — SULFAMETHOXAZOLE AND TRIMETHOPRIM 800; 160 MG/1; MG/1
1 TABLET ORAL 2 TIMES DAILY
Qty: 20 TABLET | Refills: 0 | Status: SHIPPED | OUTPATIENT
Start: 2025-01-17 | End: 2025-01-17 | Stop reason: SDUPTHER

## 2025-01-17 RX ORDER — VENLAFAXINE HYDROCHLORIDE 37.5 MG/1
CAPSULE, EXTENDED RELEASE ORAL
Qty: 60 CAPSULE | Refills: 2 | Status: SHIPPED | OUTPATIENT
Start: 2025-01-17

## 2025-01-17 RX ORDER — FLUCONAZOLE 150 MG/1
150 TABLET ORAL ONCE
Qty: 1 TABLET | Refills: 0 | Status: SHIPPED | OUTPATIENT
Start: 2025-01-17 | End: 2025-01-17

## 2025-01-17 RX ORDER — ONDANSETRON 8 MG/1
8 TABLET, ORALLY DISINTEGRATING ORAL EVERY 8 HOURS PRN
Qty: 15 TABLET | Refills: 0 | Status: SHIPPED | OUTPATIENT
Start: 2025-01-17

## 2025-01-17 RX ORDER — SULFAMETHOXAZOLE AND TRIMETHOPRIM 800; 160 MG/1; MG/1
1 TABLET ORAL 2 TIMES DAILY
Qty: 20 TABLET | Refills: 0 | Status: SHIPPED | OUTPATIENT
Start: 2025-01-17 | End: 2025-01-27

## 2025-01-17 RX ORDER — SUMATRIPTAN 50 MG/1
TABLET, FILM COATED ORAL
Qty: 6 TABLET | Refills: 0 | Status: SHIPPED | OUTPATIENT
Start: 2025-01-17

## 2025-01-17 NOTE — PROGRESS NOTES
"Chief Complaint  Insect Bite    Subjective      Maggi Nelson is a 34 y.o. female who presents to [facility]     History of Present Illness  The patient is a 34-year-old female who presents today with concern for a spider bite.    She reports taking a 1.5-hour nap yesterday, after which she experienced pain in her left leg upon waking up in the afternoon. The site of the pain now has a martin. She does not report any significant swelling in her lower leg but notes extreme sensitivity on the entire left side, even with movement. She has a history of MRSA infection.    Additionally, she has been experiencing nausea, dizziness, sensitivity to light, foggy thinking, and general malaise and fatigue.        Patient Care Team:  Saba Goldsmith APRN as PCP - General (Emergency Medicine)    Objective   Vital Signs:   Vitals:    01/17/25 1223   BP: 134/76   Pulse: 91   Temp: 98.1 °F (36.7 °C)   SpO2: 99%   Weight: 105 kg (230 lb 8 oz)   Height: 175.3 cm (69\")     Body mass index is 34.04 kg/m².    Wt Readings from Last 3 Encounters:   01/17/25 105 kg (230 lb 8 oz)   12/07/24 105 kg (231 lb 7.7 oz)   11/22/24 105 kg (232 lb 8 oz)     BP Readings from Last 3 Encounters:   01/17/25 134/76   12/07/24 137/73   11/22/24 122/76       Health Maintenance   Topic Date Due    Pneumococcal Vaccine 0-64 (1 of 2 - PCV) Never done    TDAP/TD VACCINES (1 - Tdap) Never done    HEPATITIS C SCREENING  Never done    PAP SMEAR  Never done    COVID-19 Vaccine (1 - 2024-25 season) Never done    INFLUENZA VACCINE  03/31/2025 (Originally 7/1/2024)    ANNUAL PHYSICAL  11/22/2025    BMI FOLLOWUP  11/22/2025       Lab Results (last 24 hours)       ** No results found for the last 24 hours. **               Physical Exam  Vitals and nursing note reviewed.   Constitutional:       General: She is not in acute distress.     Appearance: Normal appearance. She is ill-appearing.   HENT:      Head: " Normocephalic and atraumatic.   Eyes:      Extraocular Movements: Extraocular movements intact.      Conjunctiva/sclera: Conjunctivae normal.   Cardiovascular:      Rate and Rhythm: Normal rate and regular rhythm.      Heart sounds: Normal heart sounds.   Pulmonary:      Effort: Pulmonary effort is normal.      Breath sounds: Normal breath sounds.   Skin:     General: Skin is warm.      Findings: Lesion (Left lower leg: skin lesion with white head center and mild induration/swelling with severe ttp.) present.   Neurological:      General: No focal deficit present.      Mental Status: She is alert and oriented to person, place, and time.   Psychiatric:         Mood and Affect: Mood normal.         Behavior: Behavior normal.          Physical Exam  There is significant tenderness to palpation along the left lower leg where she reports a martin that popped up and is tender even for her clothes to touch.      Result Review   The following data was reviewed by: Sujatha Vargas MD on 01/17/2025:  [x]  Tests & Results  []  Hospitalization/Emergency Department/Urgent Care  []  Internal/External Consultant Notes    Results        Procedures          ASSESSMENT/PLAN  Diagnoses and all orders for this visit:    1. Abscess of left lower leg (Primary)  -     fluconazole (Diflucan) 150 MG tablet; Take 1 tablet by mouth 1 (One) Time for 1 dose.  Dispense: 1 tablet; Refill: 0  -     sulfamethoxazole-trimethoprim (Bactrim DS) 800-160 MG per tablet; Take 1 tablet by mouth 2 (Two) Times a Day for 10 days.  Dispense: 20 tablet; Refill: 0    2. Vitamin D deficiency  -     cholecalciferol (VITAMIN D3) 250 MCG (85528 UT) capsule; Take 1 capsule by mouth Daily.  Dispense: 30 capsule; Refill: 11    3. Migraine with status migrainosus, not intractable, unspecified migraine type  -     SUMAtriptan (Imitrex) 50 MG tablet; Take one tablet at onset of headache. May repeat dose one time in 2 hours if headache not relieved.  Dispense: 6  tablet; Refill: 0    4. Nausea and vomiting, unspecified vomiting type  -     ondansetron ODT (ZOFRAN-ODT) 8 MG disintegrating tablet; Take 1 tablet by mouth Every 8 (Eight) Hours As Needed for Nausea.  Dispense: 15 tablet; Refill: 0    5. Anxiety and depression  -     venlafaxine XR (Effexor XR) 37.5 MG 24 hr capsule; Take 1 tablet by mouth daily for 7 days, then take 2 tablets by mouth daily.  Dispense: 60 capsule; Refill: 2    Other orders  -     Discontinue: sulfamethoxazole-trimethoprim (Bactrim DS) 800-160 MG per tablet; Take 1 tablet by mouth 2 (Two) Times a Day for 10 days.  Dispense: 20 tablet; Refill: 0        Assessment & Plan  1. Suspected spider bite.  She reports significant tenderness to palpation along the left lower leg where a martin has developed. There is no significant swelling, but she experiences extreme sensitivity in the entire left side even with movement. She also reports symptoms of nausea, dizziness, sensitivity to light, foggy thinking, and general malaise and fatigue. Her past history includes a previous MRSA infection.    2. Migraine.  She reports significant migraine symptoms including headache, nausea, dizziness, sensitivity to light, foggy thinking, and general malaise and fatigue.                Maggi Nelson  reports that she quit smoking about 2 years ago. Her smoking use included cigarettes. She started smoking about 17 years ago. She has a 15 pack-year smoking history. She has never used smokeless tobacco.            FOLLOW UP  Return if symptoms worsen or fail to improve.  Patient was given instructions and counseling regarding her condition or for health maintenance advice. Please see specific information pulled into the AVS if appropriate.       Sujatha Vargas MD  01/17/25  15:40 EST    Part of this note may be an electronic transcription/translation of spoken language to printed text using the Dragon Dictation System.    Patient or patient  representative verbalized consent for the use of Ambient Listening during the visit with  Sujatha Vargas MD for chart documentation. 1/17/2025  15:40 EST

## 2025-01-28 ENCOUNTER — OFFICE VISIT (OUTPATIENT)
Dept: FAMILY MEDICINE CLINIC | Facility: CLINIC | Age: 35
End: 2025-01-28
Payer: COMMERCIAL

## 2025-01-28 ENCOUNTER — TELEPHONE (OUTPATIENT)
Dept: FAMILY MEDICINE CLINIC | Facility: CLINIC | Age: 35
End: 2025-01-28

## 2025-01-28 VITALS
OXYGEN SATURATION: 98 % | SYSTOLIC BLOOD PRESSURE: 108 MMHG | HEART RATE: 87 BPM | DIASTOLIC BLOOD PRESSURE: 58 MMHG | TEMPERATURE: 98.8 F | WEIGHT: 223.1 LBS | BODY MASS INDEX: 33.04 KG/M2 | HEIGHT: 69 IN

## 2025-01-28 DIAGNOSIS — R79.89 ELEVATED TSH: ICD-10-CM

## 2025-01-28 DIAGNOSIS — H53.149 PHOTOPHOBIA: ICD-10-CM

## 2025-01-28 DIAGNOSIS — L60.3 BRITTLE NAILS: ICD-10-CM

## 2025-01-28 DIAGNOSIS — E55.9 VITAMIN D DEFICIENCY: Primary | ICD-10-CM

## 2025-01-28 DIAGNOSIS — L85.3 DRY SKIN: ICD-10-CM

## 2025-01-28 DIAGNOSIS — L60.1 ONYCHOLYSIS: ICD-10-CM

## 2025-01-28 DIAGNOSIS — R53.83 FATIGUE, UNSPECIFIED TYPE: ICD-10-CM

## 2025-01-28 DIAGNOSIS — R06.02 SHORTNESS OF BREATH: ICD-10-CM

## 2025-01-28 DIAGNOSIS — R42 DIZZINESS: ICD-10-CM

## 2025-01-28 DIAGNOSIS — R51.9 FREQUENT HEADACHES: ICD-10-CM

## 2025-01-28 DIAGNOSIS — R63.0 DECREASED APPETITE: ICD-10-CM

## 2025-01-28 DIAGNOSIS — E61.1 IRON DEFICIENCY: ICD-10-CM

## 2025-01-28 LAB
ANION GAP SERPL CALCULATED.3IONS-SCNC: 9.3 MMOL/L (ref 5–15)
BASOPHILS # BLD AUTO: 0.02 10*3/MM3 (ref 0–0.2)
BASOPHILS NFR BLD AUTO: 0.4 % (ref 0–1.5)
BUN SERPL-MCNC: 14 MG/DL (ref 6–20)
BUN/CREAT SERPL: 14.4 (ref 7–25)
CALCIUM SPEC-SCNC: 9.2 MG/DL (ref 8.6–10.5)
CHLORIDE SERPL-SCNC: 102 MMOL/L (ref 98–107)
CO2 SERPL-SCNC: 24.7 MMOL/L (ref 22–29)
CREAT SERPL-MCNC: 0.97 MG/DL (ref 0.57–1)
DEPRECATED RDW RBC AUTO: 44.8 FL (ref 37–54)
EGFRCR SERPLBLD CKD-EPI 2021: 79.3 ML/MIN/1.73
EOSINOPHIL # BLD AUTO: 0.23 10*3/MM3 (ref 0–0.4)
EOSINOPHIL NFR BLD AUTO: 4.4 % (ref 0.3–6.2)
ERYTHROCYTE [DISTWIDTH] IN BLOOD BY AUTOMATED COUNT: 13.2 % (ref 12.3–15.4)
FERRITIN SERPL-MCNC: 166 NG/ML (ref 13–150)
GLUCOSE SERPL-MCNC: 92 MG/DL (ref 65–99)
HCT VFR BLD AUTO: 36.9 % (ref 34–46.6)
HGB BLD-MCNC: 12 G/DL (ref 12–15.9)
IMM GRANULOCYTES # BLD AUTO: 0.01 10*3/MM3 (ref 0–0.05)
IMM GRANULOCYTES NFR BLD AUTO: 0.2 % (ref 0–0.5)
IRON 24H UR-MRATE: 85 MCG/DL (ref 37–145)
IRON SATN MFR SERPL: 25 % (ref 20–50)
LYMPHOCYTES # BLD AUTO: 1.9 10*3/MM3 (ref 0.7–3.1)
LYMPHOCYTES NFR BLD AUTO: 36.5 % (ref 19.6–45.3)
MCH RBC QN AUTO: 29.7 PG (ref 26.6–33)
MCHC RBC AUTO-ENTMCNC: 32.5 G/DL (ref 31.5–35.7)
MCV RBC AUTO: 91.3 FL (ref 79–97)
MONOCYTES # BLD AUTO: 0.74 10*3/MM3 (ref 0.1–0.9)
MONOCYTES NFR BLD AUTO: 14.2 % (ref 5–12)
NEUTROPHILS NFR BLD AUTO: 2.31 10*3/MM3 (ref 1.7–7)
NEUTROPHILS NFR BLD AUTO: 44.3 % (ref 42.7–76)
NRBC BLD AUTO-RTO: 0 /100 WBC (ref 0–0.2)
PLATELET # BLD AUTO: 326 10*3/MM3 (ref 140–450)
PMV BLD AUTO: 10.8 FL (ref 6–12)
POTASSIUM SERPL-SCNC: 4.1 MMOL/L (ref 3.5–5.2)
RBC # BLD AUTO: 4.04 10*6/MM3 (ref 3.77–5.28)
SODIUM SERPL-SCNC: 136 MMOL/L (ref 136–145)
T4 FREE SERPL-MCNC: 0.92 NG/DL (ref 0.92–1.68)
TIBC SERPL-MCNC: 341 MCG/DL (ref 298–536)
TRANSFERRIN SERPL-MCNC: 229 MG/DL (ref 200–360)
TSH SERPL DL<=0.05 MIU/L-ACNC: 5.68 UIU/ML (ref 0.27–4.2)
WBC NRBC COR # BLD AUTO: 5.21 10*3/MM3 (ref 3.4–10.8)

## 2025-01-28 PROCEDURE — 99214 OFFICE O/P EST MOD 30 MIN: CPT

## 2025-01-28 PROCEDURE — 83540 ASSAY OF IRON: CPT

## 2025-01-28 PROCEDURE — 84439 ASSAY OF FREE THYROXINE: CPT

## 2025-01-28 PROCEDURE — 82728 ASSAY OF FERRITIN: CPT

## 2025-01-28 PROCEDURE — 84443 ASSAY THYROID STIM HORMONE: CPT

## 2025-01-28 PROCEDURE — 80048 BASIC METABOLIC PNL TOTAL CA: CPT

## 2025-01-28 PROCEDURE — 84466 ASSAY OF TRANSFERRIN: CPT

## 2025-01-28 PROCEDURE — 85025 COMPLETE CBC W/AUTO DIFF WBC: CPT

## 2025-01-28 PROCEDURE — 86376 MICROSOMAL ANTIBODY EACH: CPT

## 2025-01-28 RX ORDER — EPINEPHRINE 0.1 MG/.1ML
0.1 INJECTION, SOLUTION INTRAMUSCULAR ONCE AS NEEDED
Qty: 3 EACH | Refills: 0 | Status: CANCELLED | OUTPATIENT
Start: 2025-01-28

## 2025-01-28 RX ORDER — EPINEPHRINE 0.3 MG/.3ML
0.3 INJECTION SUBCUTANEOUS ONCE
Qty: 1 EACH | Refills: 0 | Status: SHIPPED | OUTPATIENT
Start: 2025-01-28 | End: 2025-01-28

## 2025-01-28 RX ORDER — SUMATRIPTAN SUCCINATE 100 MG/1
TABLET ORAL
Qty: 16 TABLET | Refills: 0 | Status: SHIPPED | OUTPATIENT
Start: 2025-01-28

## 2025-01-28 NOTE — TELEPHONE ENCOUNTER
Pt needs a work excuse for appt today but put on there that the return date to be what you recommend. Please advise.

## 2025-01-28 NOTE — TELEPHONE ENCOUNTER
Caller: Maggi Nelson    Relationship: Self    Best call back number: [phone]     What form or medical record are you requesting: WORK EXCUSE    Who is requesting this form or medical record from you: EMPLOYER- [employer]    How would you like to receive the form or medical records (pick-up, mail, fax):     Timeframe paperwork needed: ASAP    Additional notes: WORK EXCUSE NEEDED FOR 1.28.2025 WITH A RETURN TO WORK AT PROVIDERS RECOMMENDATION.        CONTACT PATIENT WHEN COMPLETE.

## 2025-01-28 NOTE — PROGRESS NOTES
Chief Complaint  Chief Complaint   Patient presents with    Fatigue    Shortness of Breath    Migraine    Photophobia    Nail Problem     Nails falling off       Boris Nelson presents to [facility]  History of Present Illness  The patient is a 34-year-old female who presents today to discuss concerns regarding possible thyroid issues or vitamin deficiencies. She is having some skin issues with brittle nails and dry itchy skin. She is very fatigued, short of breath at times. She is experiencing more frequent headaches and is sensitive to light. When last seen in November she was found to have vitamin D deficiency with levels as low as 19 and she was started on a vitamin D supplement. She also was slightly anemic and started on a daily iron supplement, although iron levels were not specifically evaluated. TSH was elevated at 10, but T4 was normal, so she was not started on thyroid medication.    She reports experiencing paresthesia in her fingers and toes, describing her nails as brittle and peeling. The onset of these symptoms was approximately 2 weeks ago. She has not been applying any topical treatments to her nails and has abstained from using artificial nails for the past 4 to 5 months. She recalls a previous episode of nail fungus following a cuticle injury, which was subsequently treated. Exposure to extreme temperatures, such as cold or hot water, or stepping outside, exacerbates the pain under her nails.    She also reports experiencing pruritus, particularly under her skin. She has no personal history of thyroid disorders but notes a family history of such conditions in her mother and grandmother.    She experiences dyspnea at rest and during physical activity, accompanied by fatigue and body aches. She reports episodes of dizziness, particularly during her night shifts, and recalls an incident last week where she walked into a wall due to  unsteady gait. She also reports experiencing hot flashes, a symptom she did not previously experience.    She describes her vision as occasionally blurry and fuzzy, particularly when feeling lightheaded. She wears corrective lenses and believes her eye examinations are current.    She reports experiencing memory lapses, such as forgetting recent conversations, and feels these cognitive issues are impacting her daily functioning.    She has been unable to obtain her prescribed vitamin D supplement from the pharmacy, despite switching pharmacies. She has been taking an iron supplement daily.    She continues to take sumatriptan for her headaches, which provides some relief and reduces her light sensitivity.    She reports a decrease in appetite and has been experiencing nausea, which she attributes to her antibiotic treatment. She has lost weight, dropping from 230 pounds to 223.1 pounds since her last visit.    Supplemental Information  She got bit by a spider and was put on antibiotics by Dr. Vargas.    FAMILY HISTORY  Her mother has thyroid problems, and her maternal grandmother had her thyroid removed.      Objective     Medical History:  Past Medical History:   Diagnosis Date    ADHD (attention deficit hyperactivity disorder)     Allergic     Anemia     Anxiety     Asthma     Depression     IBS (irritable bowel syndrome)     Low back pain     Migraines     Peptic ulceration     Scoliosis      Past Surgical History:   Procedure Laterality Date    COLONOSCOPY      ENDOSCOPY      MULTIPLE TOOTH EXTRACTIONS      ROOT CANAL        Social History     Tobacco Use    Smoking status: Former     Current packs/day: 0.00     Average packs/day: 1 pack/day for 15.0 years (15.0 ttl pk-yrs)     Types: Cigarettes     Start date: 2008     Quit date: 2023     Years since quittin.0    Smokeless tobacco: Never   Vaping Use    Vaping status: Every Day    Substances: Nicotine, Flavoring    Devices: Disposable   Substance  Use Topics    Alcohol use: Not Currently     Comment: rare    Drug use: Never     Family History   Problem Relation Age of Onset    Arthritis Mother     Depression Mother     Mental illness Mother     Miscarriages / Stillbirths Mother     Alcohol abuse Father     Hearing loss Maternal Grandfather     Asthma Maternal Grandmother     Diabetes Maternal Grandmother     Hearing loss Maternal Grandmother     Hyperlipidemia Maternal Grandmother     Anxiety disorder Sister     Stroke Maternal Aunt        Medications:  Prior to Admission medications    Medication Sig Start Date End Date Taking? Authorizing Provider   cholecalciferol (VITAMIN D3) 250 MCG (37222 UT) capsule Take 1 capsule by mouth Daily. 1/17/25 1/17/26 Yes Sujatha Vargas MD   clobetasol (TEMOVATE) 0.05 % ointment Apply 1 Application topically to the appropriate area as directed 2 (Two) Times a Day. 11/22/24  Yes Saba Goldsmith APRN   EPINEPHrine 0.1 MG/0.1ML solution auto-injector Inject 0.1 mL as directed As Needed (allergic reaction). 11/26/24  Yes Saba Goldsmith APRN   ferrous gluconate (FERGON) 324 MG tablet Take 1 tablet by mouth Daily With Breakfast. 11/27/24  Yes Saba Goldsmith APRN   multivitamin with minerals (MULTIVITAMIN ADULT PO) Take 1 tablet by mouth Daily.   Yes Provider, MD Zain   ondansetron ODT (ZOFRAN-ODT) 8 MG disintegrating tablet Take 1 tablet by mouth Every 8 (Eight) Hours As Needed for Nausea. 1/17/25  Yes Sujatha Vargas MD   SUMAtriptan (Imitrex) 50 MG tablet Take one tablet at onset of headache. May repeat dose one time in 2 hours if headache not relieved. 1/17/25  Yes Sujatha Vargas MD   venlafaxine XR (Effexor XR) 37.5 MG 24 hr capsule Take 1 tablet by mouth daily for 7 days, then take 2 tablets by mouth daily. 1/17/25  Yes Sujatha Vargas MD   dicyclomine (BENTYL) 10 MG capsule Take 1 capsule by mouth 3 (Three) Times a Day As Needed for Abdominal Cramping.  Patient not  "taking: Reported on 1/17/2025 12/7/24   Emilia Lake APRN   linaclotide (Linzess) 72 MCG capsule capsule Take 1 capsule by mouth Every Morning Before Breakfast.  Patient not taking: Reported on 1/17/2025 11/22/24   Saba GoldsmithDARREN   sulfamethoxazole-trimethoprim (Bactrim DS) 800-160 MG per tablet Take 1 tablet by mouth 2 (Two) Times a Day for 10 days. 1/17/25 1/27/25  Sujatha Vargas MD        Allergies:   Nuts, Peanut oil, and Adhesive tape    Health Maintenance Due   Topic Date Due    Pneumococcal Vaccine 0-64 (1 of 2 - PCV) Never done    TDAP/TD VACCINES (1 - Tdap) Never done    HEPATITIS C SCREENING  Never done    PAP SMEAR  Never done    COVID-19 Vaccine (1 - 2024-25 season) Never done         Vital Signs:   /58 (BP Location: Left arm, Patient Position: Sitting, Cuff Size: Adult)   Pulse 87   Temp 98.8 °F (37.1 °C) (Oral)   Ht 175.3 cm (69\")   Wt 101 kg (223 lb 1.6 oz)   SpO2 98%   BMI 32.95 kg/m²     Wt Readings from Last 3 Encounters:   01/28/25 101 kg (223 lb 1.6 oz)   01/17/25 105 kg (230 lb 8 oz)   12/07/24 105 kg (231 lb 7.7 oz)     BP Readings from Last 3 Encounters:   01/28/25 108/58   01/17/25 134/76   12/07/24 137/73       Physical Exam  Vitals reviewed.   Constitutional:       Appearance: Normal appearance. She is well-developed. She is obese.   HENT:      Head: Normocephalic and atraumatic.   Eyes:      Conjunctiva/sclera: Conjunctivae normal.      Pupils: Pupils are equal, round, and reactive to light.   Cardiovascular:      Rate and Rhythm: Normal rate and regular rhythm.      Heart sounds: No murmur heard.     No friction rub. No gallop.   Pulmonary:      Effort: Pulmonary effort is normal.      Breath sounds: Normal breath sounds. No wheezing or rhonchi.   Abdominal:      General: Bowel sounds are normal. There is no distension.      Palpations: Abdomen is soft.      Tenderness: There is no abdominal tenderness.   Skin:     General: Skin is warm and dry.      " "Comments: Peeling nails   Neurological:      Mental Status: She is alert and oriented to person, place, and time.      Cranial Nerves: No cranial nerve deficit.   Psychiatric:         Mood and Affect: Mood and affect normal.         Behavior: Behavior normal.         Thought Content: Thought content normal.         Judgment: Judgment normal.       Physical Exam  Lungs are clear.  Heart sounds are normal and regular.      Result Review :    The following data was reviewed by DARREN Stein on 01/28/25 at 10:15 EST:    Common labs          11/22/2024    08:35   Common Labs   Glucose 93    BUN 17    Creatinine 0.90    Sodium 135    Potassium 3.7    Chloride 100    Calcium 9.2    Albumin 4.1    Total Bilirubin 0.2    Alkaline Phosphatase 72    AST (SGOT) 19    ALT (SGPT) 12    WBC 5.77    Hemoglobin 10.8    Hematocrit 32.7    Platelets 290    Total Cholesterol 152    Triglycerides 37    HDL Cholesterol 49    LDL Cholesterol  94      No components found for: \"VZLL30MX\"  No components found for: \"FREET4\"  No components found for: \"RXDBWMVJ99\"    No Images in the past 120 days found..    Results  Laboratory Studies  Vitamin D level was 19. TSH was elevated at 10, but T4 was normal. B12 levels were normal. Iron levels were low.               Assessment and Plan    Diagnoses and all orders for this visit:    1. Vitamin D deficiency (Primary)  -     cholecalciferol (VITAMIN D3) 250 MCG (36282 UT) capsule; Take 1 capsule by mouth Daily.  Dispense: 30 capsule; Refill: 11    2. Iron deficiency  -     Iron Profile  -     Ferritin    3. Fatigue, unspecified type    4. Frequent headaches  -     Iron Profile  -     Ferritin  -     CBC w AUTO Differential  -     Basic metabolic panel  -     SUMAtriptan (Imitrex) 100 MG tablet; Take one tablet at onset of headache. May repeat dose one time in 2 hours if headache not relieved.  Dispense: 16 tablet; Refill: 0    5. Photophobia  -     Iron Profile  -     Ferritin  -     CBC " w AUTO Differential  -     Basic metabolic panel  -     SUMAtriptan (Imitrex) 100 MG tablet; Take one tablet at onset of headache. May repeat dose one time in 2 hours if headache not relieved.  Dispense: 16 tablet; Refill: 0    6. Shortness of breath  -     Iron Profile  -     Ferritin  -     CBC w AUTO Differential  -     Basic metabolic panel    7. Dry skin  -     TSH+Free T4  -     Thyroid Peroxidase Antibody    8. Brittle nails  -     TSH+Free T4  -     Thyroid Peroxidase Antibody    9. Onycholysis    10. Dizziness  -     CBC w AUTO Differential  -     Basic metabolic panel    11. Elevated TSH  -     TSH+Free T4  -     Thyroid Peroxidase Antibody    12. Decreased appetite    Other orders  -     EPINEPHrine (Auvi-Q) 0.3 MG/0.3ML solution auto-injector injection; Inject 0.3 mL into the appropriate muscle as directed by prescriber 1 (One) Time for 1 dose.  Dispense: 1 each; Refill: 0       Assessment & Plan  1. Vitamin D deficiency.  Her vitamin D levels were significantly low at 19, which can cause fatigue, paresthesias, and other reported symptoms. A prescription for a daily vitamin D supplement will be sent to [facility]. If there are issues with insurance coverage, she can purchase over-the-counter D3. She is advised to start taking the vitamin D supplement immediately.    2. Iron deficiency anemia.  She was previously noted to be slightly anemic, and her CBC suggested low iron levels. She is advised to continue taking her iron supplement daily. A specific iron level test will be conducted to monitor her iron status.    3. Subclinical hypothyroidism.  Her previous labs indicated high TSH with normal T4, suggesting subclinical hypothyroidism. Her symptoms, including skin issues, fatigue, shortness of breath, migraines, light sensitivity, and brittle nails, could be related to her thyroid condition. A re-evaluation of her thyroid levels, including thyroid antibodies, will be conducted to assess for autoimmune thyroid  disease like Hashimoto's. If her thyroid levels are still abnormal, thyroid medication may be considered.    4. Headaches.  She experiences frequent headaches and light sensitivity. She is currently using sumatriptan 50 mg, which helps but does not completely alleviate her symptoms. A prescription for sumatriptan 100 mg will be provided, and she can take a second dose if the headache persists after 2 hours.    5. Brittle nails.  Her brittle nails could be due to a fungal infection or vitamin deficiency. She is advised to apply a protective polish twice daily to provide an extra layer of protection.    6. Nausea.  She reports nausea, which may be related to her antibiotic use. She is advised to finish her antibiotic course and continue taking her nausea medication as needed.          Smoking Cessation:    Maggicarmelo Nelson  reports that she quit smoking about 2 years ago. Her smoking use included cigarettes. She started smoking about 17 years ago. She has a 15 pack-year smoking history. She has never used smokeless tobacco.             Follow Up   No follow-ups on file.  Patient was given instructions and counseling regarding her condition or for health maintenance advice. Please see specific information pulled into the AVS if appropriate.     Please note that portions of this note were completed with a voice recognition program.    Patient or patient representative verbalized consent for the use of Ambient Listening during the visit with  DARREN Stein for chart documentation. 1/28/2025  10:15 EST

## 2025-01-29 DIAGNOSIS — E06.3 HASHIMOTO'S DISEASE: ICD-10-CM

## 2025-01-29 DIAGNOSIS — R79.89 ELEVATED TSH: ICD-10-CM

## 2025-01-29 DIAGNOSIS — R79.89 ELEVATED TSH: Primary | ICD-10-CM

## 2025-01-29 LAB — THYROPEROXIDASE AB SERPL-ACNC: 331 IU/ML (ref 0–34)

## 2025-01-29 RX ORDER — LEVOTHYROXINE SODIUM 50 UG/1
50 TABLET ORAL
Qty: 30 TABLET | Refills: 2 | Status: SHIPPED | OUTPATIENT
Start: 2025-01-29 | End: 2025-01-29 | Stop reason: SDUPTHER

## 2025-01-29 RX ORDER — LEVOTHYROXINE SODIUM 50 UG/1
50 TABLET ORAL
Qty: 30 TABLET | Refills: 2 | Status: SHIPPED | OUTPATIENT
Start: 2025-01-29

## 2025-02-06 ENCOUNTER — OFFICE VISIT (OUTPATIENT)
Dept: FAMILY MEDICINE CLINIC | Facility: CLINIC | Age: 35
End: 2025-02-06
Payer: COMMERCIAL

## 2025-02-06 VITALS
HEART RATE: 75 BPM | TEMPERATURE: 98.3 F | DIASTOLIC BLOOD PRESSURE: 62 MMHG | SYSTOLIC BLOOD PRESSURE: 106 MMHG | OXYGEN SATURATION: 99 % | HEIGHT: 69 IN | BODY MASS INDEX: 33.36 KG/M2 | WEIGHT: 225.2 LBS

## 2025-02-06 DIAGNOSIS — R63.0 DECREASED APPETITE: ICD-10-CM

## 2025-02-06 DIAGNOSIS — E06.3 HASHIMOTO'S DISEASE: Primary | ICD-10-CM

## 2025-02-06 DIAGNOSIS — K58.1 IRRITABLE BOWEL SYNDROME WITH CONSTIPATION: ICD-10-CM

## 2025-02-06 PROCEDURE — 99214 OFFICE O/P EST MOD 30 MIN: CPT

## 2025-02-06 NOTE — PROGRESS NOTES
Chief Complaint  Chief Complaint   Patient presents with    Vitamin D Deficiency    Anxiety    Hashimoto's Thyroiditis       Subjective      Maggi Yarely Nelson presents to [facility]  History of Present Illness  The patient is a 34-year-old female who presents today for follow-up on hypothyroidism.    She was having several symptoms with a questionable relation to hypothyroidism. She has a family history of hypothyroidism. In the past, she had elevated TSH but normal T4. She does have some skin issues including dryness and brittle nails. She reports fatigue, some shortness of breath, migraines, and light sensitivity. Thyroid antibody was elevated at 331. TSH was at 5.6 and free T4 was 0.92, which is borderline low. She was initiated on levothyroxine 50 mcg daily, and an ultrasound of the thyroid has been ordered but not yet obtained. She has been adhering to her levothyroxine regimen and reports overall improvement. However, she experiences difficulty with food intake due to decreased appetite and subsequent vomiting. She also reports throat discomfort, even in the absence of vomiting. Despite attempts to eat when she feels hungry, she often loses her appetite during meals. Rapid consumption of liquids induces gagging. She has a scheduled ultrasound for the upcoming Tuesday. She has been compliant with her medication schedule, ensuring a 4-hour gap between food intake and medication administration. She reports a sensation of hunger but lacks the desire to eat, and attempts to eat result in nausea. She does not attribute these symptoms to her medication as they predate its initiation. She reports persistent feelings of dehydration and chapped lips. She has been unable to retain Zofran due to vomiting. She does not report any diarrhea but reports constipation, a symptom she has always associated with her IBS. She had hoped that vitamin supplementation would alleviate her  constipation, but it has not. Linzess has been effective for her constipation, but she has been unable to obtain it from [facility].    FAMILY HISTORY  She has a family history of hypothyroidism.      Objective     Medical History:  Past Medical History:   Diagnosis Date    ADHD (attention deficit hyperactivity disorder)     Allergic     Anemia     Anxiety     Asthma     Depression     IBS (irritable bowel syndrome)     Low back pain     Migraines     Peptic ulceration     Scoliosis      Past Surgical History:   Procedure Laterality Date    COLONOSCOPY      ENDOSCOPY      MULTIPLE TOOTH EXTRACTIONS      ROOT CANAL        Social History     Tobacco Use    Smoking status: Former     Current packs/day: 0.00     Average packs/day: 1 pack/day for 15.0 years (15.0 ttl pk-yrs)     Types: Cigarettes     Start date: 2008     Quit date: 2023     Years since quittin.1    Smokeless tobacco: Never   Vaping Use    Vaping status: Every Day    Substances: Nicotine, Flavoring    Devices: Disposable   Substance Use Topics    Alcohol use: Not Currently     Comment: rare    Drug use: Never     Family History   Problem Relation Age of Onset    Arthritis Mother     Depression Mother     Mental illness Mother     Miscarriages / Stillbirths Mother     Alcohol abuse Father     Hearing loss Maternal Grandfather     Asthma Maternal Grandmother     Diabetes Maternal Grandmother     Hearing loss Maternal Grandmother     Hyperlipidemia Maternal Grandmother     Anxiety disorder Sister     Stroke Maternal Aunt        Medications:  Prior to Admission medications    Medication Sig Start Date End Date Taking? Authorizing Provider   cholecalciferol (VITAMIN D3) 250 MCG (17013 UT) capsule Take 1 capsule by mouth Daily. 25 Yes Saba Goldsmith APRN   clobetasol (TEMOVATE) 0.05 % ointment Apply 1 Application topically to the appropriate area as directed 2 (Two) Times a Day. 24  Yes Saba Goldsmith APRN  "  ferrous gluconate (FERGON) 324 MG tablet Take 1 tablet by mouth Daily With Breakfast. 11/27/24  Yes Saba Goldsmith APRN   levothyroxine (Synthroid) 50 MCG tablet Take 1 tablet by mouth Every Morning. 1/29/25  Yes Saba Goldsmith APRN   linaclotide (Linzess) 72 MCG capsule capsule Take 1 capsule by mouth Every Morning Before Breakfast. 11/22/24  Yes Saba Goldsmith APRN   multivitamin with minerals (MULTIVITAMIN ADULT PO) Take 1 tablet by mouth Daily.   Yes Zain Meek MD   ondansetron ODT (ZOFRAN-ODT) 8 MG disintegrating tablet Take 1 tablet by mouth Every 8 (Eight) Hours As Needed for Nausea. 1/17/25  Yes Sujatha Vargas MD   SUMAtriptan (Imitrex) 100 MG tablet Take one tablet at onset of headache. May repeat dose one time in 2 hours if headache not relieved. 1/28/25  Yes Saba Goldsmith APRN   venlafaxine XR (Effexor XR) 37.5 MG 24 hr capsule Take 1 tablet by mouth daily for 7 days, then take 2 tablets by mouth daily. 1/17/25  Yes Sujatha Vargas MD        Allergies:   Nuts, Peanut oil, and Adhesive tape    Health Maintenance Due   Topic Date Due    Pneumococcal Vaccine 0-64 (1 of 2 - PCV) Never done    TDAP/TD VACCINES (1 - Tdap) Never done    HEPATITIS C SCREENING  Never done    PAP SMEAR  Never done    COVID-19 Vaccine (1 - 2024-25 season) Never done         Vital Signs:   /62 (BP Location: Left arm, Patient Position: Sitting, Cuff Size: Adult)   Pulse 75   Temp 98.3 °F (36.8 °C) (Oral)   Ht 175.3 cm (69\")   Wt 102 kg (225 lb 3.2 oz)   SpO2 99%   BMI 33.26 kg/m²     Wt Readings from Last 3 Encounters:   02/06/25 102 kg (225 lb 3.2 oz)   01/28/25 101 kg (223 lb 1.6 oz)   01/17/25 105 kg (230 lb 8 oz)     BP Readings from Last 3 Encounters:   02/06/25 106/62   01/28/25 108/58   01/17/25 134/76     Physical Exam  Vitals reviewed.   Constitutional:       Appearance: Normal appearance. She is well-developed. She is obese.   HENT:      Head: " "Normocephalic and atraumatic.   Eyes:      Conjunctiva/sclera: Conjunctivae normal.      Pupils: Pupils are equal, round, and reactive to light.   Cardiovascular:      Rate and Rhythm: Normal rate and regular rhythm.   Pulmonary:      Effort: Pulmonary effort is normal.      Breath sounds: Normal breath sounds.   Abdominal:      General: Bowel sounds are normal. There is no distension.      Palpations: Abdomen is soft.      Tenderness: There is no abdominal tenderness.   Skin:     General: Skin is warm and dry.      Comments: Peeling nails   Neurological:      Mental Status: She is alert and oriented to person, place, and time.      Cranial Nerves: No cranial nerve deficit.   Psychiatric:         Mood and Affect: Mood and affect normal.         Behavior: Behavior normal.         Thought Content: Thought content normal.         Judgment: Judgment normal.       Physical Exam        Result Review :    The following data was reviewed by DARREN Stein on 02/06/25 at 08:59 EST:    Common labs          11/22/2024    08:35 1/28/2025    08:29   Common Labs   Glucose 93  92    BUN 17  14    Creatinine 0.90  0.97    Sodium 135  136    Potassium 3.7  4.1    Chloride 100  102    Calcium 9.2  9.2    Albumin 4.1     Total Bilirubin 0.2     Alkaline Phosphatase 72     AST (SGOT) 19     ALT (SGPT) 12     WBC 5.77  5.21    Hemoglobin 10.8  12.0    Hematocrit 32.7  36.9    Platelets 290  326    Total Cholesterol 152     Triglycerides 37     HDL Cholesterol 49     LDL Cholesterol  94       No components found for: \"FREET4\"    No Images in the past 120 days found..    Results  Laboratory Studies  Thyroid antibody was elevated at 331. TSH was at 5.6 and free T4 was 0.92.               Assessment and Plan    Diagnoses and all orders for this visit:    1. Hashimoto's disease (Primary)  -     TSH Rfx On Abnormal To Free T4; Future  -     Thyroid Peroxidase Antibody; Future    2. Decreased appetite    3. Irritable bowel " syndrome with constipation  -     linaclotide (Linzess) 72 MCG capsule capsule; Take 1 capsule by mouth Every Morning Before Breakfast.  Dispense: 30 capsule; Refill: 2       Assessment & Plan  1. Hypothyroidism.  Her symptoms, including fatigue, loss of appetite, and constipation, are likely due to Hashimoto's thyroiditis, as indicated by her elevated TPO antibody levels. The enlargement and inflammation of her thyroid gland could be contributing to these symptoms. She is advised to continue her levothyroxine 50 mcg daily regimen. An ultrasound of the thyroid has been ordered and is scheduled for next Tuesday. A prescription for Linzess has been provided to manage her constipation. She is also advised to take Zofran to alleviate her nausea. If she continues to experience difficulty in retaining food, weight loss, and lack of appetite within the next 1 to 2 weeks, she should seek medical attention sooner. She is encouraged to use MiraLAX or any other effective treatment for her constipation. If her nausea persists despite taking Zofran, she should return for a consultation.    Follow-up  The patient is scheduled for a follow-up visit in 2 months to monitor her thyroid levels and assess the effectiveness of the current medication dosage.          Smoking Cessation:    Maggi Nelson  reports that she quit smoking about 2 years ago. Her smoking use included cigarettes. She started smoking about 17 years ago. She has a 15 pack-year smoking history. She has never used smokeless tobacco.             Follow Up   Return in about 2 months (around 4/6/2025) for Next scheduled follow up.  Patient was given instructions and counseling regarding her condition or for health maintenance advice. Please see specific information pulled into the AVS if appropriate.     Please note that portions of this note were completed with a voice recognition program.    Patient or patient representative verbalized consent for the use  of Ambient Listening during the visit with  DARREN Stein for chart documentation. 2/6/2025  08:59 EST

## 2025-02-12 ENCOUNTER — HOSPITAL ENCOUNTER (OUTPATIENT)
Dept: ULTRASOUND IMAGING | Facility: HOSPITAL | Age: 35
Discharge: HOME | End: 2025-02-12
Payer: COMMERCIAL

## 2025-02-12 PROCEDURE — 76536 US EXAM OF HEAD AND NECK: CPT

## 2025-02-14 DIAGNOSIS — G43.901 MIGRAINE WITH STATUS MIGRAINOSUS, NOT INTRACTABLE, UNSPECIFIED MIGRAINE TYPE: ICD-10-CM

## 2025-02-14 RX ORDER — SUMATRIPTAN 50 MG/1
TABLET, FILM COATED ORAL
Qty: 6 TABLET | Refills: 0 | OUTPATIENT
Start: 2025-02-14

## 2025-04-07 DIAGNOSIS — F41.9 ANXIETY AND DEPRESSION: ICD-10-CM

## 2025-04-07 DIAGNOSIS — F32.A ANXIETY AND DEPRESSION: ICD-10-CM

## 2025-04-07 RX ORDER — VENLAFAXINE HYDROCHLORIDE 37.5 MG/1
CAPSULE, EXTENDED RELEASE ORAL
Qty: 60 CAPSULE | Refills: 2 | Status: SHIPPED | OUTPATIENT
Start: 2025-04-07

## 2025-04-11 ENCOUNTER — OFFICE VISIT (OUTPATIENT)
Dept: FAMILY MEDICINE CLINIC | Facility: CLINIC | Age: 35
End: 2025-04-11
Payer: COMMERCIAL

## 2025-04-11 VITALS
TEMPERATURE: 98 F | HEIGHT: 69 IN | SYSTOLIC BLOOD PRESSURE: 126 MMHG | WEIGHT: 230 LBS | DIASTOLIC BLOOD PRESSURE: 84 MMHG | OXYGEN SATURATION: 98 % | BODY MASS INDEX: 34.07 KG/M2 | HEART RATE: 81 BPM

## 2025-04-11 DIAGNOSIS — R11.2 NAUSEA AND VOMITING, UNSPECIFIED VOMITING TYPE: ICD-10-CM

## 2025-04-11 DIAGNOSIS — E06.3 HASHIMOTO'S DISEASE: Primary | ICD-10-CM

## 2025-04-11 DIAGNOSIS — R53.83 FATIGUE, UNSPECIFIED TYPE: ICD-10-CM

## 2025-04-11 LAB
T4 FREE SERPL-MCNC: 1.18 NG/DL (ref 0.92–1.68)
TSH SERPL DL<=0.05 MIU/L-ACNC: 8.51 UIU/ML (ref 0.27–4.2)

## 2025-04-11 PROCEDURE — 86376 MICROSOMAL ANTIBODY EACH: CPT

## 2025-04-11 PROCEDURE — 84439 ASSAY OF FREE THYROXINE: CPT

## 2025-04-11 PROCEDURE — 84443 ASSAY THYROID STIM HORMONE: CPT

## 2025-04-11 RX ORDER — ONDANSETRON 8 MG/1
8 TABLET, ORALLY DISINTEGRATING ORAL EVERY 8 HOURS PRN
Qty: 15 TABLET | Refills: 0 | Status: SHIPPED | OUTPATIENT
Start: 2025-04-11

## 2025-04-11 NOTE — PROGRESS NOTES
Chief Complaint  Chief Complaint   Patient presents with    Hashimoto's Thyroiditis    Fatigue       Subjective      Maggi Nelson presents to [facility]  History of Present Illness  The patient is a 34-year-old female who presents today to follow up on Hashimoto's thyroiditis. She had elevated TPO levels at 331, TSH was 5.6, and free T4 was 0.92. She was initiated on levothyroxine 50 mcg daily, and an ultrasound of the thyroid done on 02/12/2025 showed a mildly heterogenous and hypervascular thyroid gland indicative of active thyroiditis.    She reports a slight improvement in her condition following the initiation of levothyroxine therapy. However, she continues to experience persistent symptoms such as fatigue, dry skin, and constipation. She has been adhering to her levothyroxine regimen, taking it every morning on an empty stomach, although she admits to occasionally missing doses.    She also reports a sensation of a soft ball-like mass in her throat, predominantly on the right side, which causes discomfort during swallowing and necessitates positional adjustments during sleep to prevent airway obstruction. She experiences difficulty in swallowing pills and constant throat pain.    Her appetite has improved, but she still experiences mild nausea and occasional vomiting. She does not have Zofran for nausea.    Over the past few weeks, she has been experiencing significant body aches, which have resulted in missed workdays. She describes severe fatigue and pain that have rendered her immobile on certain days. The pain is localized to her hips and muscles.        Objective     Medical History:  Past Medical History:   Diagnosis Date    ADHD (attention deficit hyperactivity disorder)     Allergic     Anemia     Anxiety     Asthma     Depression     IBS (irritable bowel syndrome)     Low back pain     Migraines     Peptic ulceration     Scoliosis      Past Surgical  History:   Procedure Laterality Date    COLONOSCOPY      ENDOSCOPY      MULTIPLE TOOTH EXTRACTIONS      ROOT CANAL        Social History     Tobacco Use    Smoking status: Former     Current packs/day: 0.00     Average packs/day: 1 pack/day for 15.0 years (15.0 ttl pk-yrs)     Types: Cigarettes     Start date: 2008     Quit date: 2023     Years since quittin.2    Smokeless tobacco: Never   Vaping Use    Vaping status: Every Day    Substances: Nicotine, Flavoring    Devices: Disposable   Substance Use Topics    Alcohol use: Not Currently     Comment: rare    Drug use: Never     Family History   Problem Relation Age of Onset    Arthritis Mother     Depression Mother     Mental illness Mother     Miscarriages / Stillbirths Mother     Alcohol abuse Father     Hearing loss Maternal Grandfather     Asthma Maternal Grandmother     Diabetes Maternal Grandmother     Hearing loss Maternal Grandmother     Hyperlipidemia Maternal Grandmother     Anxiety disorder Sister     Stroke Maternal Aunt        Medications:  Prior to Admission medications    Medication Sig Start Date End Date Taking? Authorizing Provider   cholecalciferol (VITAMIN D3) 250 MCG (40209 UT) capsule Take 1 capsule by mouth Daily. 25 Yes Saba Goldsmith APRN   clobetasol (TEMOVATE) 0.05 % ointment Apply 1 Application topically to the appropriate area as directed 2 (Two) Times a Day. 24  Yes Saba Goldsmith APRN   ferrous gluconate (FERGON) 324 MG tablet Take 1 tablet by mouth Daily With Breakfast. 24  Yes Saba Goldsmith APRN   levothyroxine (Synthroid) 50 MCG tablet Take 1 tablet by mouth Every Morning. 25  Yes Saba Goldsmith APRN   linaclotide (Linzess) 72 MCG capsule capsule Take 1 capsule by mouth Every Morning Before Breakfast. 25  Yes Saba Goldsmith APRN   multivitamin with minerals (MULTIVITAMIN ADULT PO) Take 1 tablet by mouth Daily.   Yes Provider, MD Zain  "  ondansetron ODT (ZOFRAN-ODT) 8 MG disintegrating tablet Take 1 tablet by mouth Every 8 (Eight) Hours As Needed for Nausea. 1/17/25  Yes Sujatha Vargas MD   SUMAtriptan (Imitrex) 100 MG tablet Take one tablet at onset of headache. May repeat dose one time in 2 hours if headache not relieved. 1/28/25  Yes Saba Goldsmith APRN   venlafaxine XR (Effexor XR) 37.5 MG 24 hr capsule Take 1 tablet by mouth daily for 7 days, then take 2 tablets by mouth daily. 4/7/25  Yes Saba Goldsmith APRN        Allergies:   Nuts, Peanut oil, and Adhesive tape    Health Maintenance Due   Topic Date Due    Pneumococcal Vaccine 0-49 (1 of 2 - PCV) Never done    TDAP/TD VACCINES (1 - Tdap) Never done    PAP SMEAR  Never done    HEPATITIS C SCREENING  Never done    COVID-19 Vaccine (1 - 2024-25 season) Never done         Vital Signs:   /84 (BP Location: Left arm, Patient Position: Sitting, Cuff Size: Adult)   Pulse 81   Temp 98 °F (36.7 °C) (Oral)   Ht 175.3 cm (69\")   Wt 104 kg (230 lb)   SpO2 98%   BMI 33.97 kg/m²     Wt Readings from Last 3 Encounters:   04/11/25 104 kg (230 lb)   02/06/25 102 kg (225 lb 3.2 oz)   01/28/25 101 kg (223 lb 1.6 oz)     BP Readings from Last 3 Encounters:   04/11/25 126/84   02/06/25 106/62   01/28/25 108/58                Physical Exam  Vitals reviewed.   Constitutional:       Appearance: Normal appearance. She is well-developed. She is obese.   HENT:      Head: Normocephalic and atraumatic.   Eyes:      Conjunctiva/sclera: Conjunctivae normal.      Pupils: Pupils are equal, round, and reactive to light.   Neck:      Thyroid: Thyromegaly and thyroid tenderness present.   Cardiovascular:      Rate and Rhythm: Normal rate and regular rhythm.   Pulmonary:      Effort: Pulmonary effort is normal.      Breath sounds: Normal breath sounds.   Abdominal:      General: Bowel sounds are normal. There is no distension.      Palpations: Abdomen is soft.      Tenderness: There is no " "abdominal tenderness.   Skin:     General: Skin is warm and dry.      Comments: Peeling nails   Neurological:      Mental Status: She is alert and oriented to person, place, and time.      Cranial Nerves: No cranial nerve deficit.   Psychiatric:         Mood and Affect: Mood and affect normal.         Behavior: Behavior normal.         Thought Content: Thought content normal.         Judgment: Judgment normal.       Physical Exam  Thyroid is enlarged.      Result Review :    The following data was reviewed by DARREN Stein on 04/11/25 at 10:36 EDT:    Common labs          11/22/2024    08:35 1/28/2025    08:29   Common Labs   Glucose 93  92    BUN 17  14    Creatinine 0.90  0.97    Sodium 135  136    Potassium 3.7  4.1    Chloride 100  102    Calcium 9.2  9.2    Albumin 4.1     Total Bilirubin 0.2     Alkaline Phosphatase 72     AST (SGOT) 19     ALT (SGPT) 12     WBC 5.77  5.21    Hemoglobin 10.8  12.0    Hematocrit 32.7  36.9    Platelets 290  326    Total Cholesterol 152     Triglycerides 37     HDL Cholesterol 49     LDL Cholesterol  94       No components found for: \"FREET4\"    US Thyroid  Result Date: 2/13/2025  Impression: Mildly heterogeneous and hypervascular thyroid gland, correlate for active thyroiditis. TI-RADS: TR1 - Benign. No follow up needed. Electronically Signed: Garcia Chadwick MD  2/13/2025 10:31 AM EST  Workstation ID: [id number]      Results  Laboratory Studies  TPO levels were elevated at 331. TSH was 5.6. Free T4 was 0.92.    Imaging  Ultrasound of the thyroid done on 02/12/2025 showed mildly heterogenous and hypervascular thyroid gland indicative of active thyroiditis.               Assessment and Plan    Diagnoses and all orders for this visit:    1. Hashimoto's disease (Primary)  -     TSH Rfx On Abnormal To Free T4  -     Thyroid Peroxidase Antibody    2. Fatigue, unspecified type    3. Nausea and vomiting, unspecified vomiting type  -     ondansetron ODT (ZOFRAN-ODT) 8 MG " disintegrating tablet; Take 1 tablet by mouth Every 8 (Eight) Hours As Needed for Nausea.  Dispense: 15 tablet; Refill: 0       Assessment & Plan  1. Hashimoto's thyroiditis.  Her symptoms of fatigue, dry skin, constipation, body aches, and throat discomfort are likely related to her ongoing thyroiditis. She has been taking levothyroxine 50 mcg daily but reports only slight improvement. A re-evaluation of her thyroid levels will be conducted today to determine if an adjustment in her levothyroxine dosage is necessary. She is advised to continue her current levothyroxine regimen until further instructions are provided based on the lab results.    2. Nausea.  She reports persistent nausea. A prescription for Zofran will be refilled to manage this symptom.    3. Body aches.  She reports significant body aches, which may be related to her thyroid condition. She is advised to take Tylenol or ibuprofen to alleviate these symptoms until her thyroid levels are better regulated.          Smoking Cessation:    Maggi Nelson  reports that she quit smoking about 2 years ago. Her smoking use included cigarettes. She started smoking about 17 years ago. She has a 15 pack-year smoking history. She has never used smokeless tobacco.             Follow Up   Return in about 2 months (around 6/11/2025) for Next scheduled follow up.  Patient was given instructions and counseling regarding her condition or for health maintenance advice. Please see specific information pulled into the AVS if appropriate.     Please note that portions of this note were completed with a voice recognition program.    Patient or patient representative verbalized consent for the use of Ambient Listening during the visit with  DARREN Stein for chart documentation. 4/11/2025  10:36 EDT

## 2025-04-12 LAB — THYROPEROXIDASE AB SERPL-ACNC: 342 IU/ML (ref 0–34)

## 2025-04-17 ENCOUNTER — RESULTS FOLLOW-UP (OUTPATIENT)
Dept: FAMILY MEDICINE CLINIC | Facility: CLINIC | Age: 35
End: 2025-04-17
Payer: COMMERCIAL

## 2025-04-17 DIAGNOSIS — R79.89 ELEVATED TSH: ICD-10-CM

## 2025-04-17 DIAGNOSIS — E06.3 HASHIMOTO'S DISEASE: ICD-10-CM

## 2025-04-17 DIAGNOSIS — E06.9 THYROIDITIS: Primary | ICD-10-CM

## 2025-04-17 RX ORDER — LEVOTHYROXINE SODIUM 75 UG/1
75 TABLET ORAL
Qty: 30 TABLET | Refills: 1 | Status: SHIPPED | OUTPATIENT
Start: 2025-04-17

## 2025-04-22 ENCOUNTER — OFFICE VISIT (OUTPATIENT)
Dept: OBSTETRICS AND GYNECOLOGY | Age: 35
End: 2025-04-22
Payer: COMMERCIAL

## 2025-04-22 VITALS
HEART RATE: 89 BPM | DIASTOLIC BLOOD PRESSURE: 86 MMHG | WEIGHT: 235 LBS | BODY MASS INDEX: 34.7 KG/M2 | SYSTOLIC BLOOD PRESSURE: 134 MMHG

## 2025-04-22 DIAGNOSIS — H53.149 PHOTOPHOBIA: ICD-10-CM

## 2025-04-22 DIAGNOSIS — Z30.432 ENCOUNTER FOR IUD REMOVAL: Primary | ICD-10-CM

## 2025-04-22 DIAGNOSIS — R51.9 FREQUENT HEADACHES: ICD-10-CM

## 2025-04-22 RX ORDER — EPINEPHRINE 0.3 MG/.3ML
INJECTION SUBCUTANEOUS
COMMUNITY
Start: 2025-01-28

## 2025-04-22 RX ORDER — SUMATRIPTAN SUCCINATE 100 MG/1
100 TABLET ORAL
Qty: 9 TABLET | Refills: 1 | Status: SHIPPED | OUTPATIENT
Start: 2025-04-22

## 2025-04-22 RX ORDER — SULFAMETHOXAZOLE AND TRIMETHOPRIM 800; 160 MG/1; MG/1
TABLET ORAL
COMMUNITY
Start: 2025-04-21

## 2025-04-22 NOTE — PROGRESS NOTES
Procedures  IUD Removal Procedure Note    Type of IUD:  Mirena  Date of insertion:  unknown  Reason for removal:  Device expiration  Other relevant history/information:  none    Procedure Time Documentation  The risks of the procedure were reviewed with the patient including bleeding, infection and unlikely damage to the uterus and the benefits of the procedure were explained to the patient and Written informed consent was obtained    Procedure Details  IUD strings visible:  yes  Local anesthesia:  None  Tenaculum used:  None  Removal:  IUD strings grasped and IUD removed intact with gentle traction.  The patient tolerated the procedure well.    All appropriate instructions regarding removal were reviewed.    Patient tolerated the procedure well without complications.    Plans for contraception:  no method    Other follow-up needed:  Annual exam/Pap    The patient was advised to call for any fever or for prolonged or severe pain or bleeding. She was advised to use OTC analgesics as needed for mild to moderate pain.     Travis Lloyd, APRN  4/22/2025  10:59 EDT

## 2025-04-24 ENCOUNTER — PATIENT ROUNDING (BHMG ONLY) (OUTPATIENT)
Dept: OBSTETRICS AND GYNECOLOGY | Age: 35
End: 2025-04-24
Payer: COMMERCIAL

## 2025-05-19 DIAGNOSIS — E06.3 HASHIMOTO'S DISEASE: ICD-10-CM

## 2025-05-19 DIAGNOSIS — R79.89 ELEVATED TSH: ICD-10-CM

## 2025-05-20 RX ORDER — LEVOTHYROXINE SODIUM 50 UG/1
50 TABLET ORAL EVERY MORNING
Qty: 30 TABLET | Refills: 2 | Status: SHIPPED | OUTPATIENT
Start: 2025-05-20

## 2025-06-12 DIAGNOSIS — R51.9 FREQUENT HEADACHES: ICD-10-CM

## 2025-06-12 DIAGNOSIS — H53.149 PHOTOPHOBIA: ICD-10-CM

## 2025-06-12 RX ORDER — SUMATRIPTAN SUCCINATE 100 MG/1
100 TABLET ORAL
Qty: 9 TABLET | Refills: 1 | Status: SHIPPED | OUTPATIENT
Start: 2025-06-12

## 2025-06-13 ENCOUNTER — OFFICE VISIT (OUTPATIENT)
Dept: FAMILY MEDICINE CLINIC | Facility: CLINIC | Age: 35
End: 2025-06-13
Payer: COMMERCIAL

## 2025-06-13 VITALS
TEMPERATURE: 98.1 F | DIASTOLIC BLOOD PRESSURE: 72 MMHG | HEIGHT: 69 IN | HEART RATE: 92 BPM | OXYGEN SATURATION: 99 % | BODY MASS INDEX: 34.75 KG/M2 | WEIGHT: 234.6 LBS | SYSTOLIC BLOOD PRESSURE: 120 MMHG

## 2025-06-13 DIAGNOSIS — L84 CORN OF FOOT: ICD-10-CM

## 2025-06-13 DIAGNOSIS — R45.89 FIDGETING: ICD-10-CM

## 2025-06-13 DIAGNOSIS — M79.671 PAIN IN BOTH FEET: ICD-10-CM

## 2025-06-13 DIAGNOSIS — M79.672 PAIN IN BOTH FEET: ICD-10-CM

## 2025-06-13 DIAGNOSIS — E06.9 THYROIDITIS: ICD-10-CM

## 2025-06-13 DIAGNOSIS — F17.298 OTHER TOBACCO PRODUCT NICOTINE DEPENDENCE WITH OTHER NICOTINE-INDUCED DISORDER: ICD-10-CM

## 2025-06-13 DIAGNOSIS — E06.3 HASHIMOTO'S DISEASE: Primary | ICD-10-CM

## 2025-06-13 DIAGNOSIS — L60.3 SPLITTING OF TOENAIL: ICD-10-CM

## 2025-06-13 DIAGNOSIS — F32.A ANXIETY AND DEPRESSION: ICD-10-CM

## 2025-06-13 DIAGNOSIS — F41.9 ANXIETY AND DEPRESSION: ICD-10-CM

## 2025-06-13 DIAGNOSIS — R41.840 IMPAIRED CONCENTRATION: ICD-10-CM

## 2025-06-13 DIAGNOSIS — R11.2 NAUSEA AND VOMITING, UNSPECIFIED VOMITING TYPE: ICD-10-CM

## 2025-06-13 DIAGNOSIS — Z11.59 NEED FOR HEPATITIS C SCREENING TEST: ICD-10-CM

## 2025-06-13 DIAGNOSIS — Z13.39 ATTENTION DEFICIT HYPERACTIVITY DISORDER (ADHD) EVALUATION: ICD-10-CM

## 2025-06-13 DIAGNOSIS — F40.10 SOCIAL ANXIETY DISORDER: ICD-10-CM

## 2025-06-13 LAB
HCV AB SER QL: NORMAL
T-UPTAKE NFR SERPL: 1.05 TBI (ref 0.8–1.3)
T4 SERPL-MCNC: 5.57 MCG/DL (ref 4.5–11.7)
TSH SERPL DL<=0.05 MIU/L-ACNC: 5.43 UIU/ML (ref 0.27–4.2)

## 2025-06-13 PROCEDURE — 84443 ASSAY THYROID STIM HORMONE: CPT

## 2025-06-13 PROCEDURE — 86376 MICROSOMAL ANTIBODY EACH: CPT

## 2025-06-13 PROCEDURE — 84436 ASSAY OF TOTAL THYROXINE: CPT

## 2025-06-13 PROCEDURE — 84479 ASSAY OF THYROID (T3 OR T4): CPT

## 2025-06-13 PROCEDURE — 86803 HEPATITIS C AB TEST: CPT

## 2025-06-13 RX ORDER — ONDANSETRON 8 MG/1
8 TABLET, ORALLY DISINTEGRATING ORAL EVERY 8 HOURS PRN
Qty: 15 TABLET | Refills: 0 | Status: SHIPPED | OUTPATIENT
Start: 2025-06-13

## 2025-06-13 RX ORDER — BUPROPION HYDROCHLORIDE 150 MG/1
150 TABLET, EXTENDED RELEASE ORAL 2 TIMES DAILY
Qty: 60 TABLET | Refills: 2 | Status: SHIPPED | OUTPATIENT
Start: 2025-06-13

## 2025-06-13 RX ORDER — NICOTINE 21 MG/24HR
1 PATCH, TRANSDERMAL 24 HOURS TRANSDERMAL EVERY 24 HOURS
Qty: 14 EACH | Refills: 0 | Status: SHIPPED | OUTPATIENT
Start: 2025-06-13

## 2025-06-13 RX ORDER — EPINEPHRINE 0.3 MG/.3ML
0.3 INJECTION INTRAMUSCULAR ONCE
Qty: 1 EACH | Refills: 0 | Status: SHIPPED | OUTPATIENT
Start: 2025-06-13 | End: 2025-06-13

## 2025-06-13 NOTE — PROGRESS NOTES
Chief Complaint  Chief Complaint   Patient presents with    Hashimoto's Thyroiditis    Fatigue    Nicotine Dependence     Pt would like to discuss options to quit smoking, preferably nicotine patches.       Subjective      Maggi Nelson presents to [facility]  History of Present Illness  The patient presents for evaluation of Hashimoto's thyroiditis, nicotine dependence, mental health, and foot issues.    She reports experiencing internal throat discomfort, which she attributes to her medication regimen. This discomfort occasionally leads to nausea and subsequent cessation of eating. She also mentions persistent fatigue and the need to maintain a specific sleeping position to facilitate optimal breathing at night. She describes a sensation of her throat closing up, causing pain both internally and externally. A thyroid ultrasound on 02/13/2025 showed active inflammation of the thyroid, and she was started on medication. She has not yet consulted with an endocrinologist regarding these symptoms.    She expresses a desire to discontinue vaping and is considering the use of nicotine patches as a cessation aid. She notes that cigarette smoking exacerbates her throat discomfort and induces nausea, while vaping also contributes to throat irritation. Despite these symptoms, she continues to experience nicotine cravings.    She suspects she may have ADHD due to frequent stimming and difficulty maintaining focus, particularly in social settings. She also reports episodes of hyperfocus, which have led to missed exams and other commitments. She has not previously sought psychiatric evaluation for these symptoms. She was previously prescribed Effexor but did not start that as it was not covered by her insurance and has not tried Wellbutrin.    She reports that her toenails are falling off and growing back normally. She has been walking on her ankles due to fallen arches, resulting  in mild swelling and the development of calluses on both feet. She has never been to a foot doctor before. She has been using [employer] shoes, which initially provided relief but have become uncomfortable over time. She has attempted to use shoe inserts without success.    She had to use her EpiPen due to an allergic reaction to food but was unable to physically use it because it was slippery and round. The needle did not retract. She is used to the normal square-shaped EpiPen.    She is in nursing school and needs an exemption form for vaccines, including the COVID-19 vaccine, as she is concerned about how her body will react.    SOCIAL HISTORY  She admits to vaping but does not smoke cigarettes.      Objective     Medical History:  Past Medical History:   Diagnosis Date    ADHD (attention deficit hyperactivity disorder)     Allergic     Anemia     Anxiety     Asthma     Depression     IBS (irritable bowel syndrome)     Low back pain     Migraines     Peptic ulceration     Scoliosis      Past Surgical History:   Procedure Laterality Date    COLONOSCOPY      ENDOSCOPY      MULTIPLE TOOTH EXTRACTIONS      ROOT CANAL        Social History     Tobacco Use    Smoking status: Former     Current packs/day: 0.00     Average packs/day: 1 pack/day for 15.0 years (15.0 ttl pk-yrs)     Types: Cigarettes     Start date: 2008     Quit date: 2023     Years since quittin.4    Smokeless tobacco: Never   Vaping Use    Vaping status: Every Day    Substances: Nicotine, Flavoring    Devices: Disposable   Substance Use Topics    Alcohol use: Not Currently     Comment: rare    Drug use: Never     Family History   Problem Relation Age of Onset    Arthritis Mother     Depression Mother     Mental illness Mother     Miscarriages / Stillbirths Mother     Alcohol abuse Father     Hearing loss Maternal Grandfather     Asthma Maternal Grandmother     Diabetes Maternal Grandmother     Hearing loss Maternal Grandmother     Hyperlipidemia  Maternal Grandmother     Anxiety disorder Sister     Stroke Maternal Aunt        Medications:  Prior to Admission medications    Medication Sig Start Date End Date Taking? Authorizing Provider   cholecalciferol (VITAMIN D3) 250 MCG (55689 UT) capsule Take 1 capsule by mouth Daily. 1/28/25 1/28/26 Yes Saba Goldsmith APRN   clobetasol (TEMOVATE) 0.05 % ointment Apply 1 Application topically to the appropriate area as directed 2 (Two) Times a Day. 11/22/24  Yes Saba Goldsmith APRN   EPINEPHrine (EPIPEN) 0.3 MG/0.3ML solution auto-injector injection  1/28/25  Yes Zain Meek MD   ferrous gluconate (FERGON) 324 MG tablet Take 1 tablet by mouth Daily With Breakfast. 11/27/24  Yes Saba Goldsmith APRN   levothyroxine (Synthroid) 75 MCG tablet Take 1 tablet by mouth Every Morning. 4/17/25  Yes Saba Goldsmith APRN   linaclotide (Linzess) 72 MCG capsule capsule Take 1 capsule by mouth Every Morning Before Breakfast. 2/6/25  Yes Saba Goldsmith APRN   multivitamin with minerals (MULTIVITAMIN ADULT PO) Take 1 tablet by mouth Daily.   Yes Zain Meek MD   ondansetron ODT (ZOFRAN-ODT) 8 MG disintegrating tablet Take 1 tablet by mouth Every 8 (Eight) Hours As Needed for Nausea. 6/13/25  Yes Saba Goldsmith APRN   SUMAtriptan (IMITREX) 100 MG tablet TAKE ONE TABLET AT ONSET OF HEADACHE. MAY REPEAT DOSE ONE TIME IN 2 HOURS IF HEADACHE NOT RELIEVED. 6/12/25  Yes Saba Goldsmith APRN   venlafaxine XR (Effexor XR) 37.5 MG 24 hr capsule Take 1 tablet by mouth daily for 7 days, then take 2 tablets by mouth daily. 4/7/25  Yes Saba Goldsmith APRN   ondansetron ODT (ZOFRAN-ODT) 8 MG disintegrating tablet Take 1 tablet by mouth Every 8 (Eight) Hours As Needed for Nausea. 4/11/25 6/13/25 Yes Saba Goldsmith APRN   EpiPen 2-Ector 0.3 MG/0.3ML solution auto-injector injection Inject 0.3 mL into the appropriate muscle as directed by prescriber 1 (One)  "Time for 1 dose. 6/13/25 6/13/25  AgusSaba APRN   levothyroxine (SYNTHROID, LEVOTHROID) 50 MCG tablet TAKE 1 TABLET BY MOUTH EVERY DAY IN THE MORNING 5/20/25 6/13/25  Shadia GoldsmithDARREN Kramer   sulfamethoxazole-trimethoprim (BACTRIM DS,SEPTRA DS) 800-160 MG per tablet  4/21/25 6/13/25  Provider, MD Zain        Allergies:   Nuts, Peanut oil, and Adhesive tape    Health Maintenance Due   Topic Date Due    Pneumococcal Vaccine 0-49 (1 of 2 - PCV) Never done    TDAP/TD VACCINES (1 - Tdap) Never done    PAP SMEAR  Never done    HEPATITIS C SCREENING  Never done    COVID-19 Vaccine (1 - 2024-25 season) Never done         Vital Signs:   /72 (BP Location: Left arm, Patient Position: Sitting, Cuff Size: Adult)   Pulse 92   Temp 98.1 °F (36.7 °C) (Oral)   Ht 175.3 cm (69\")   Wt 106 kg (234 lb 9.6 oz)   SpO2 99%   BMI 34.64 kg/m²     Wt Readings from Last 3 Encounters:   06/13/25 106 kg (234 lb 9.6 oz)   04/22/25 107 kg (235 lb)   04/11/25 104 kg (230 lb)     BP Readings from Last 3 Encounters:   06/13/25 120/72   04/22/25 134/86   04/11/25 126/84     Physical Exam  Vitals reviewed.   Constitutional:       Appearance: Normal appearance. She is well-developed. She is obese.   HENT:      Head: Normocephalic and atraumatic.   Eyes:      Conjunctiva/sclera: Conjunctivae normal.      Pupils: Pupils are equal, round, and reactive to light.   Neck:      Thyroid: Thyromegaly and thyroid tenderness present.   Cardiovascular:      Rate and Rhythm: Normal rate and regular rhythm.   Pulmonary:      Effort: Pulmonary effort is normal.      Breath sounds: Normal breath sounds.   Abdominal:      General: Bowel sounds are normal. There is no distension.      Palpations: Abdomen is soft.      Tenderness: There is no abdominal tenderness.   Skin:     General: Skin is warm and dry.      Comments: Peeling nails   Neurological:      Mental Status: She is alert and oriented to person, place, and time.      " "Cranial Nerves: No cranial nerve deficit.   Psychiatric:         Mood and Affect: Mood and affect normal.         Behavior: Behavior normal.         Thought Content: Thought content normal.         Judgment: Judgment normal.       Physical Exam  Neck: Tenderness noted on palpation      Result Review :    The following data was reviewed by DARREN Stein on 06/13/25 at 09:21 EDT:    Common labs          11/22/2024    08:35 1/28/2025    08:29   Common Labs   Glucose 93  92    BUN 17  14    Creatinine 0.90  0.97    Sodium 135  136    Potassium 3.7  4.1    Chloride 100  102    Calcium 9.2  9.2    Albumin 4.1     Total Bilirubin 0.2     Alkaline Phosphatase 72     AST (SGOT) 19     ALT (SGPT) 12     WBC 5.77  5.21    Hemoglobin 10.8  12.0    Hematocrit 32.7  36.9    Platelets 290  326    Total Cholesterol 152     Triglycerides 37     HDL Cholesterol 49     LDL Cholesterol  94       TSH          11/22/2024    08:35 1/28/2025    08:29 4/11/2025    09:09   TSH   TSH 10.700  5.680  8.510      No components found for: \"FREET4\"    No Images in the past 120 days found..    Results  Labs   - Thyroid peroxidase antibody: 02/2025, High   - Thyroid hormone: Normal    Imaging   - Thyroid ultrasound: 02/13/2025, Active inflammation of the thyroid               Assessment and Plan    Diagnoses and all orders for this visit:    1. Hashimoto's disease (Primary)  -     Thyroid Panel With TSH  -     Thyroid Peroxidase Antibody    2. Nausea and vomiting, unspecified vomiting type  -     ondansetron ODT (ZOFRAN-ODT) 8 MG disintegrating tablet; Take 1 tablet by mouth Every 8 (Eight) Hours As Needed for Nausea.  Dispense: 15 tablet; Refill: 0    3. Thyroiditis  -     Thyroid Panel With TSH  -     Thyroid Peroxidase Antibody    4. [place] of foot  -     Ambulatory Referral to Podiatry    5. Pain in both feet  -     Ambulatory Referral to Podiatry    6. Splitting of toenail  -     Ambulatory Referral to Podiatry    7. Anxiety and " depression  -     Ambulatory Referral to Psychiatry  -     buPROPion SR (Wellbutrin SR) 150 MG 12 hr tablet; Take 1 tablet by mouth 2 (Two) Times a Day.  Dispense: 60 tablet; Refill: 2    8. Need for hepatitis C screening test  -     Hepatitis C antibody    9. Other tobacco product nicotine dependence with other nicotine-induced disorder  -     nicotine (NICODERM CQ) 14 MG/24HR patch; Place 1 patch on the skin as directed by provider Daily.  Dispense: 14 each; Refill: 0  -     nicotine (NICODERM CQ) 7 MG/24HR patch; Place 1 patch on the skin as directed by provider Daily.  Dispense: 14 each; Refill: 1    10. Fidgeting  -     Ambulatory Referral to Psychiatry  -     buPROPion SR (Wellbutrin SR) 150 MG 12 hr tablet; Take 1 tablet by mouth 2 (Two) Times a Day.  Dispense: 60 tablet; Refill: 2    11. Social anxiety disorder  -     Ambulatory Referral to Psychiatry  -     buPROPion SR (Wellbutrin SR) 150 MG 12 hr tablet; Take 1 tablet by mouth 2 (Two) Times a Day.  Dispense: 60 tablet; Refill: 2    12. Impaired concentration  -     Ambulatory Referral to Psychiatry  -     buPROPion SR (Wellbutrin SR) 150 MG 12 hr tablet; Take 1 tablet by mouth 2 (Two) Times a Day.  Dispense: 60 tablet; Refill: 2    13. Attention deficit hyperactivity disorder (ADHD) evaluation  -     Ambulatory Referral to Psychiatry    Other orders  -     EpiPen 2-Ector 0.3 MG/0.3ML solution auto-injector injection; Inject 0.3 mL into the appropriate muscle as directed by prescriber 1 (One) Time for 1 dose.  Dispense: 1 each; Refill: 0       Assessment & Plan  1. Hashimoto's thyroiditis.  - Elevated thyroid peroxidase antibody levels since 02/2025, indicating an autoimmune condition.  - Normal thyroid hormone levels during the last assessment, but significantly high TPO levels suggesting active thyroiditis.  - Currently on Synthroid 75 mcg.  - Referral to endocrinology for further evaluation; thyroid levels will be reassessed, and Synthroid dosage may be  increased if necessary.    2. Nicotine dependence.  - Advised to try nicotine patches for smoking cessation.  - Wellbutrin (bupropion) prescribed to help with nicotine cravings, depression, and anxiety.  - Referral to psychiatry for a formal evaluation to determine if Wellbutrin is appropriate or if additional medication is needed.    3.  Mental health.  - Reports symptoms consistent with ADHD, including difficulty focusing and hyperactivity.  - Wellbutrin (bupropion) prescribed to help with ADHD symptoms.  - Referral to psychiatry for a formal evaluation to confirm the diagnosis and determine if additional treatment is necessary.    4. [place].  - Reports painful calluses on her feet.  - Referral to a podiatrist for an assessment of her arch and determination of the need for special insoles or shoes.  - Podiatrist to evaluate toenails to rule out any thyroid-related issues.    5. Medication management.  - Reports difficulty using current EpiPen due to its design.  - Prescription for a name-brand EpiPen provided.  - Advised to check the EpiPen at the pharmacy before leaving to ensure it is the correct one.    6. Encounter for immunization.  - Advised to drop off the exemption form for vaccines at the office for review.          Smoking Cessation:    Maggi Nelson  reports that she quit smoking about 2 years ago. Her smoking use included cigarettes. She started smoking about 17 years ago. She has a 15 pack-year smoking history. She has never used smokeless tobacco.             Follow Up   No follow-ups on file.  Patient was given instructions and counseling regarding her condition or for health maintenance advice. Please see specific information pulled into the AVS if appropriate.     Please note that portions of this note were completed with a voice recognition program.    Patient or patient representative verbalized consent for the use of Ambient Listening during the visit with  Saba Goldsmith  APRN for chart documentation. 6/13/2025  09:21 EDT

## 2025-06-14 LAB — THYROPEROXIDASE AB SERPL-ACNC: 270 IU/ML (ref 0–34)

## 2025-06-18 ENCOUNTER — PRIOR AUTHORIZATION (OUTPATIENT)
Dept: FAMILY MEDICINE CLINIC | Facility: CLINIC | Age: 35
End: 2025-06-18
Payer: COMMERCIAL

## 2025-06-18 NOTE — TELEPHONE ENCOUNTER
EpiPen 2-Ector 0.3MG/0.3ML auto-injectors PA DENIAL     INSURANCE STATES AUVI-Q IS WHAT IS ON FORMULARY    Why your request was denied:  Your plan only covers this drug when you meet one of these options: A) You have tried other drugs  your plan covers (preferred drugs), and they did not work well for you, or B) Your doctor gives us a  medical reason you cannot take those other drugs. For your plan, you may need to try up to three  preferred drugs. We have denied your request for this drug because your doctor told us that you will be  taking a preferred drug instead. We reviewed the information we had. Your request has been denied.  Your doctor can send us any new or missing information for us to review. The preferred drugs for your  plan are: epinephrine (except NDCs 47633 -[id number] and [id number]), AUVI-Q. (Requirement:  3 in a class with 3 or more alternatives, 2 in a class with 2 alternatives, or 1 in a class with only 1  alternative.). Your doctor may need to get approval from your plan for preferred drugs. For this drug,  you may have to meet other criteria. You can request the drug policy for more details. You can also  request other plan documents for your review.

## 2025-07-07 DIAGNOSIS — E06.9 THYROIDITIS: ICD-10-CM

## 2025-07-07 DIAGNOSIS — E06.3 HASHIMOTO'S DISEASE: ICD-10-CM

## 2025-07-07 DIAGNOSIS — R79.89 ELEVATED TSH: ICD-10-CM

## 2025-07-09 RX ORDER — LEVOTHYROXINE SODIUM 75 UG/1
75 TABLET ORAL
Qty: 30 TABLET | Refills: 1 | Status: SHIPPED | OUTPATIENT
Start: 2025-07-09

## 2025-07-11 DIAGNOSIS — F17.298 OTHER TOBACCO PRODUCT NICOTINE DEPENDENCE WITH OTHER NICOTINE-INDUCED DISORDER: ICD-10-CM

## 2025-07-11 RX ORDER — NICOTINE 21 MG/24HR
PATCH, TRANSDERMAL 24 HOURS TRANSDERMAL
Qty: 14 PATCH | Refills: 1 | Status: SHIPPED | OUTPATIENT
Start: 2025-07-11

## 2025-07-12 ENCOUNTER — HOSPITAL ENCOUNTER (EMERGENCY)
Facility: HOSPITAL | Age: 35
Discharge: HOME | End: 2025-07-12
Attending: EMERGENCY MEDICINE
Payer: COMMERCIAL

## 2025-07-12 ENCOUNTER — APPOINTMENT (OUTPATIENT)
Dept: GENERAL RADIOLOGY | Facility: HOSPITAL | Age: 35
End: 2025-07-12
Payer: COMMERCIAL

## 2025-07-12 VITALS
DIASTOLIC BLOOD PRESSURE: 70 MMHG | HEIGHT: 69 IN | SYSTOLIC BLOOD PRESSURE: 136 MMHG | RESPIRATION RATE: 18 BRPM | TEMPERATURE: 97.4 F | OXYGEN SATURATION: 100 % | BODY MASS INDEX: 34.61 KG/M2 | HEART RATE: 72 BPM | WEIGHT: 233.69 LBS

## 2025-07-12 DIAGNOSIS — S16.1XXA STRAIN OF NECK MUSCLE, INITIAL ENCOUNTER: Primary | ICD-10-CM

## 2025-07-12 DIAGNOSIS — S29.012A STRAIN OF THORACIC BACK REGION: ICD-10-CM

## 2025-07-12 DIAGNOSIS — S39.012A LUMBOSACRAL STRAIN, INITIAL ENCOUNTER: ICD-10-CM
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
## 2025-07-12 PROCEDURE — 96372 THER/PROPH/DIAG INJ SC/IM: CPT

## 2025-07-12 PROCEDURE — 72050 X-RAY EXAM NECK SPINE 4/5VWS: CPT

## 2025-07-12 PROCEDURE — 99283 EMERGENCY DEPT VISIT LOW MDM: CPT

## 2025-07-12 PROCEDURE — 72072 X-RAY EXAM THORAC SPINE 3VWS: CPT

## 2025-07-12 PROCEDURE — 25010000002 ORPHENADRINE CITRATE PER 60 MG

## 2025-07-12 PROCEDURE — 72100 X-RAY EXAM L-S SPINE 2/3 VWS: CPT

## 2025-07-12 RX ORDER — METHOCARBAMOL 750 MG/1
750 TABLET, FILM COATED ORAL 3 TIMES DAILY PRN
Qty: 15 TABLET | Refills: 0 | Status: SHIPPED | OUTPATIENT
Start: 2025-07-12 | End: 2025-07-17

## 2025-07-12 RX ORDER — ORPHENADRINE CITRATE 30 MG/ML
60 INJECTION INTRAMUSCULAR; INTRAVENOUS ONCE
Status: COMPLETED | OUTPATIENT
Start: 2025-07-12 | End: 2025-07-12

## 2025-07-12 RX ADMIN — ORPHENADRINE CITRATE 60 MG: 60 INJECTION INTRAMUSCULAR; INTRAVENOUS at 12:35

## 2025-07-12 NOTE — Clinical Note
[facility] EMERGENCY ROOM  [address] [place] [address]  Phone: [phone]  Fax: [phone]    Maggi Nelson was seen and treated in our emergency department on 7/12/2025.  She may return to work on 07/14/2025.         Thank you for choosing [facility].    Dwayne De Leon PA-C

## 2025-07-12 NOTE — Clinical Note
[facility] EMERGENCY ROOM  [address] [place] [address]  Phone: [phone]  Fax: [phone]    Maggi Nelson was seen and treated in our emergency department on 7/12/2025.  She may return to work on 07/14/2025.         Thank you for choosing [facility].    Dwayne De Leon PA-C
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
## 2025-07-25 DIAGNOSIS — F17.298 OTHER TOBACCO PRODUCT NICOTINE DEPENDENCE WITH OTHER NICOTINE-INDUCED DISORDER: ICD-10-CM

## 2025-08-05 DIAGNOSIS — L20.82 FLEXURAL ECZEMA: ICD-10-CM

## 2025-08-05 RX ORDER — CLOBETASOL PROPIONATE 0.5 MG/G
OINTMENT TOPICAL 2 TIMES DAILY
Qty: 60 G | Refills: 1 | Status: SHIPPED | OUTPATIENT
Start: 2025-08-05

## 2025-08-15 ENCOUNTER — OFFICE VISIT (OUTPATIENT)
Dept: FAMILY MEDICINE CLINIC | Facility: CLINIC | Age: 35
End: 2025-08-15
Payer: COMMERCIAL

## 2025-08-15 VITALS
WEIGHT: 227.7 LBS | DIASTOLIC BLOOD PRESSURE: 82 MMHG | OXYGEN SATURATION: 97 % | SYSTOLIC BLOOD PRESSURE: 114 MMHG | BODY MASS INDEX: 33.72 KG/M2 | HEIGHT: 69 IN | TEMPERATURE: 98.3 F | HEART RATE: 85 BPM

## 2025-08-15 DIAGNOSIS — F40.10 SOCIAL ANXIETY DISORDER: ICD-10-CM

## 2025-08-15 DIAGNOSIS — F41.9 ANXIETY AND DEPRESSION: ICD-10-CM

## 2025-08-15 DIAGNOSIS — R19.7 DIARRHEA, UNSPECIFIED TYPE: ICD-10-CM

## 2025-08-15 DIAGNOSIS — F32.A ANXIETY AND DEPRESSION: ICD-10-CM

## 2025-08-15 DIAGNOSIS — H66.001 NON-RECURRENT ACUTE SUPPURATIVE OTITIS MEDIA OF RIGHT EAR WITHOUT SPONTANEOUS RUPTURE OF TYMPANIC MEMBRANE: ICD-10-CM

## 2025-08-15 DIAGNOSIS — E06.3 HASHIMOTO'S DISEASE: ICD-10-CM

## 2025-08-15 DIAGNOSIS — J02.9 PHARYNGITIS, UNSPECIFIED ETIOLOGY: ICD-10-CM

## 2025-08-15 DIAGNOSIS — R52 GENERALIZED BODY ACHES: ICD-10-CM

## 2025-08-15 DIAGNOSIS — R68.83 CHILLS: Primary | ICD-10-CM

## 2025-08-15 LAB
EXPIRATION DATE: NORMAL
EXPIRATION DATE: NORMAL
FLUAV AG UPPER RESP QL IA.RAPID: NOT DETECTED
FLUBV AG UPPER RESP QL IA.RAPID: NOT DETECTED
INTERNAL CONTROL: NORMAL
INTERNAL CONTROL: NORMAL
Lab: NORMAL
Lab: NORMAL
S PYO AG THROAT QL: NEGATIVE
SARS-COV-2 AG UPPER RESP QL IA.RAPID: NOT DETECTED
T4 FREE SERPL-MCNC: 1.18 NG/DL (ref 0.92–1.68)
TSH SERPL DL<=0.05 MIU/L-ACNC: 4.08 UIU/ML (ref 0.27–4.2)

## 2025-08-15 PROCEDURE — 84439 ASSAY OF FREE THYROXINE: CPT

## 2025-08-15 PROCEDURE — 86376 MICROSOMAL ANTIBODY EACH: CPT

## 2025-08-15 PROCEDURE — 84443 ASSAY THYROID STIM HORMONE: CPT

## 2025-08-15 RX ORDER — AZITHROMYCIN 250 MG/1
TABLET, FILM COATED ORAL
Qty: 6 TABLET | Refills: 0 | Status: SHIPPED | OUTPATIENT
Start: 2025-08-15

## 2025-08-15 RX ORDER — METHYLPREDNISOLONE 4 MG/1
TABLET ORAL
Qty: 21 TABLET | Refills: 0 | Status: SHIPPED | OUTPATIENT
Start: 2025-08-15

## 2025-08-17 LAB — THYROPEROXIDASE AB SERPL-ACNC: 242 IU/ML (ref 0–34)

## 2025-08-19 DIAGNOSIS — E06.9 THYROIDITIS: ICD-10-CM

## 2025-08-19 DIAGNOSIS — E06.3 HASHIMOTO'S DISEASE: ICD-10-CM

## 2025-08-19 DIAGNOSIS — R79.89 ELEVATED TSH: ICD-10-CM

## 2025-08-19 RX ORDER — LEVOTHYROXINE SODIUM 75 UG/1
75 TABLET ORAL
Qty: 30 TABLET | Refills: 2 | Status: SHIPPED | OUTPATIENT
Start: 2025-08-19

## 2025-08-25 ENCOUNTER — HOSPITAL ENCOUNTER (OUTPATIENT)
Dept: ULTRASOUND IMAGING | Facility: HOSPITAL | Age: 35
Discharge: HOME | End: 2025-08-25
Payer: COMMERCIAL

## 2025-08-25 DIAGNOSIS — E06.3 HASHIMOTO'S DISEASE: ICD-10-CM

## 2025-08-25 PROCEDURE — 76536 US EXAM OF HEAD AND NECK: CPT
